# Patient Record
Sex: FEMALE | Race: WHITE | NOT HISPANIC OR LATINO | Employment: FULL TIME | URBAN - METROPOLITAN AREA
[De-identification: names, ages, dates, MRNs, and addresses within clinical notes are randomized per-mention and may not be internally consistent; named-entity substitution may affect disease eponyms.]

---

## 2017-08-21 ENCOUNTER — OFFICE VISIT (OUTPATIENT)
Dept: FAMILY MEDICINE CLINIC | Facility: CLINIC | Age: 47
End: 2017-08-21

## 2017-08-21 VITALS
WEIGHT: 182 LBS | OXYGEN SATURATION: 95 % | BODY MASS INDEX: 29.25 KG/M2 | DIASTOLIC BLOOD PRESSURE: 78 MMHG | RESPIRATION RATE: 20 BRPM | HEART RATE: 88 BPM | HEIGHT: 66 IN | SYSTOLIC BLOOD PRESSURE: 122 MMHG

## 2017-08-21 DIAGNOSIS — J30.1 SEASONAL ALLERGIC RHINITIS DUE TO POLLEN: Primary | ICD-10-CM

## 2017-08-21 PROCEDURE — 99213 OFFICE O/P EST LOW 20 MIN: CPT | Performed by: NURSE PRACTITIONER

## 2017-08-21 RX ORDER — CETIRIZINE HYDROCHLORIDE, PSEUDOEPHEDRINE HYDROCHLORIDE 5; 120 MG/1; MG/1
1 TABLET, FILM COATED, EXTENDED RELEASE ORAL 2 TIMES DAILY
COMMUNITY

## 2017-08-21 NOTE — PROGRESS NOTES
"Subjective   Ofe Whitehead is a 47 y.o. female.   Chief Complaint   Patient presents with   • Allergies     [facility]      Vitals:    08/21/17 0822   BP: 122/78   BP Location: Right arm   Patient Position: Sitting   Cuff Size: Adult   Pulse: 88   Resp: 20   SpO2: 95%   Weight: 182 lb (82.6 kg)   Height: 66\" (167.6 cm)     No Known Allergies    HPI Comments: New patient- zyrtec-d managing allergies.   \Reviewed labs- discussed slightly elevated glucose (102) all other labs normal.     URI    This is a recurrent problem. The current episode started more than 1 month ago. The problem has been waxing and waning. There has been no fever. Associated symptoms include congestion, a plugged ear sensation, rhinorrhea, sneezing and a sore throat. Pertinent negatives include no ear pain, headaches or swollen glands. She has tried antihistamine for the symptoms. The treatment provided moderate relief.        The following portions of the patient's history were reviewed and updated as appropriate: allergies, current medications, past family history, past medical history, past social history, past surgical history and problem list.    Review of Systems   Constitutional: Negative for chills, diaphoresis, fatigue and fever.   HENT: Positive for congestion, rhinorrhea, sneezing and sore throat. Negative for ear pain.    Eyes: Negative.    Respiratory: Negative.    Cardiovascular: Negative.    Gastrointestinal: Negative.    Skin: Negative.    Neurological: Negative for headaches.   All other systems reviewed and are negative.      Objective   Physical Exam   Constitutional: She is oriented to person, place, and time. She appears well-developed and well-nourished.   HENT:   Head: Normocephalic and atraumatic.   Right Ear: External ear normal.   Left Ear: External ear normal.   Cardiovascular: Normal rate.    Pulmonary/Chest: Effort normal.   Neurological: She is alert and oriented to person, place, and " time.   Psychiatric: She has a normal mood and affect. Her behavior is normal. Judgment and thought content normal.   Nursing note and vitals reviewed.      Assessment/Plan   Problems Addressed this Visit     None      Visit Diagnoses     Seasonal allergic rhinitis due to pollen    -  Primary

## 2017-10-07 ENCOUNTER — OFFICE VISIT (OUTPATIENT)
Dept: RETAIL CLINIC | Facility: CLINIC | Age: 47
End: 2017-10-07

## 2017-10-07 DIAGNOSIS — Z23 NEED FOR VACCINATION: Primary | ICD-10-CM

## 2017-10-07 NOTE — PATIENT INSTRUCTIONS
"Influenza (Flu) Vaccine (Inactivated or Recombinant):   1. Why get vaccinated?  Influenza (\"flu\") is a contagious disease that spreads around the [place] every year, usually between October and May.  Flu is caused by influenza viruses, and is spread mainly by coughing, sneezing, and close contact.  Anyone can get flu. Flu strikes suddenly and can last several days. Symptoms vary by age, but can include:  · fever/chills  · sore throat  · muscle aches  · fatigue  · cough  · headache  · runny or stuffy nose  Flu can also lead to pneumonia and blood infections, and cause diarrhea and seizures in children. If you have a medical condition, such as heart or lung disease, flu can make it worse.  Flu is more dangerous for some people. Infants and young children, people 65 years of age and older, pregnant women, and people with certain health conditions or a weakened immune system are at greatest risk.  Each year thousands of people in the [place] die from flu, and many more are hospitalized.  Flu vaccine can:  · keep you from getting flu,  · make flu less severe if you do get it, and  · keep you from spreading flu to your family and other people.  2. Inactivated and recombinant flu vaccines  A dose of flu vaccine is recommended every flu season. Children 6 months through 8 years of age may need two doses during the same flu season. Everyone else needs only one dose each flu season.  Some inactivated flu vaccines contain a very small amount of a mercury-based preservative called thimerosal. Studies have not shown thimerosal in vaccines to be harmful, but flu vaccines that do not contain thimerosal are available.  There is no live flu virus in flu shots. They cannot cause the flu.  There are many flu viruses, and they are always changing. Each year a new flu vaccine is made to protect against three or four viruses that are likely to cause disease in the upcoming flu season. But even when the vaccine doesn't exactly " match these viruses, it may still provide some protection.  Flu vaccine cannot prevent:  · flu that is caused by a virus not covered by the vaccine, or  · illnesses that look like flu but are not.  It takes about 2 weeks for protection to develop after vaccination, and protection lasts through the flu season.  3. Some people should not get this vaccine  Tell the person who is giving you the vaccine:  · If you have any severe, life-threatening allergies. If you ever had a life-threatening allergic reaction after a dose of flu vaccine, or have a severe allergy to any part of this vaccine, you may be advised not to get vaccinated. Most, but not all, types of flu vaccine contain a small amount of egg protein.  · If you ever had Guillain-Rosepine Syndrome (also called GBS). Some people with a history of GBS should not get this vaccine. This should be discussed with your doctor.  · If you are not feeling well. It is usually okay to get flu vaccine when you have a mild illness, but you might be asked to come back when you feel better.  4. Risks of a vaccine reaction  With any medicine, including vaccines, there is a chance of reactions. These are usually mild and go away on their own, but serious reactions are also possible.  Most people who get a flu shot do not have any problems with it.  Minor problems following a flu shot include:  · soreness, redness, or swelling where the shot was given  · hoarseness  · sore, red or itchy eyes  · cough  · fever  · aches  · headache  · itching  · fatigue  If these problems occur, they usually begin soon after the shot and last 1 or 2 days.  More serious problems following a flu shot can include the following:  · There may be a small increased risk of Guillain-Rosepine Syndrome (GBS) after inactivated flu vaccine. This risk has been estimated at 1 or 2 additional cases per million people vaccinated. This is much lower than the risk of severe complications from flu, which can be prevented by  flu vaccine.  · Young children who get the flu shot along with pneumococcal vaccine (PCV13) and/or DTaP vaccine at the same time might be slightly more likely to have a seizure caused by fever. Ask your doctor for more information. Tell your doctor if a child who is getting flu vaccine has ever had a seizure.  Problems that could happen after any injected vaccine:  · People sometimes faint after a medical procedure, including vaccination. Sitting or lying down for about 15 minutes can help prevent fainting, and injuries caused by a fall. Tell your doctor if you feel dizzy, or have vision changes or ringing in the ears.  · Some people get severe pain in the shoulder and have difficulty moving the arm where a shot was given. This happens very rarely.  · Any medication can cause a severe allergic reaction. Such reactions from a vaccine are very rare, estimated at about 1 in a million doses, and would happen within a few minutes to a few hours after the vaccination.  As with any medicine, there is a very remote chance of a vaccine causing a serious injury or death.  The safety of vaccines is always being monitored. For more information, visit: www.cdc.gov/vaccinesafety/  5. What if there is a serious reaction?  What should I look for?  · Look for anything that concerns you, such as signs of a severe allergic reaction, very high fever, or unusual behavior.  Signs of a severe allergic reaction can include hives, swelling of the face and throat, difficulty breathing, a fast heartbeat, dizziness, and weakness. These would start a few minutes to a few hours after the vaccination.  What should I do?  · If you think it is a severe allergic reaction or other emergency that can't wait, call 9-1-1 and get the person to the nearest hospital. Otherwise, call your doctor.  · Reactions should be reported to the Vaccine Adverse Event Reporting System (VAERS). Your doctor should file this report, or you can do it yourself through the  VAERS web site at www.vaers.[facility].gov, or by calling 1-832.393.8610.  VAERS does not give medical advice.  6. The National Vaccine Injury Compensation Program  The National Vaccine Injury Compensation Program (VICP) is a federal program that was created to compensate people who may have been injured by certain vaccines.  Persons who believe they may have been injured by a vaccine can learn about the program and about filing a claim by calling 1-499.182.3844 or visiting the VICP website at www.[facility].gov/vaccinecompensation. There is a time limit to file a claim for compensation.  7. How can I learn more?  · Ask your healthcare provider. He or she can give you the vaccine package insert or suggest other sources of information.  · Call your local or state health department.  · Contact the Centers for Disease Control and Prevention (CDC):    Call 1-141.910.8344 (0-749-UTH-INFO) or    Visit CDC's website at www.cdc.gov/flu  Vaccine Information Statement Inactivated Influenza Vaccine (08/07/2015)     This information is not intended to replace advice given to you by your health care provider. Make sure you discuss any questions you have with your health care provider.     Document Released: 10/12/2007 Document Revised: 01/08/2016 Document Reviewed: 08/10/2015  Elsevier Interactive Patient Education ©2017 [employer]

## 2017-12-22 ENCOUNTER — APPOINTMENT (OUTPATIENT)
Dept: WOMENS IMAGING | Facility: HOSPITAL | Age: 47
End: 2017-12-22

## 2017-12-22 PROCEDURE — G0202 SCR MAMMO BI INCL CAD: HCPCS | Performed by: RADIOLOGY

## 2017-12-22 PROCEDURE — 77067 SCR MAMMO BI INCL CAD: CPT | Performed by: RADIOLOGY

## 2018-10-13 ENCOUNTER — OFFICE VISIT (OUTPATIENT)
Dept: RETAIL CLINIC | Facility: CLINIC | Age: 48
End: 2018-10-13

## 2018-10-13 DIAGNOSIS — Z23 NEED FOR VACCINATION: Primary | ICD-10-CM

## 2018-10-13 NOTE — PATIENT INSTRUCTIONS
"Influenza (Flu) Vaccine (Inactivated or Recombinant): What You Need to Know  1. Why get vaccinated?  Influenza (\"flu\") is a contagious disease that spreads around the [place] every year, usually between October and May.  Flu is caused by influenza viruses, and is spread mainly by coughing, sneezing, and close contact.  Anyone can get flu. Flu strikes suddenly and can last several days. Symptoms vary by age, but can include:  · fever/chills  · sore throat  · muscle aches  · fatigue  · cough  · headache  · runny or stuffy nose    Flu can also lead to pneumonia and blood infections, and cause diarrhea and seizures in children. If you have a medical condition, such as heart or lung disease, flu can make it worse.  Flu is more dangerous for some people. Infants and young children, people 65 years of age and older, pregnant women, and people with certain health conditions or a weakened immune system are at greatest risk.  Each year thousands of people in the [place] die from flu, and many more are hospitalized.  Flu vaccine can:  · keep you from getting flu,  · make flu less severe if you do get it, and  · keep you from spreading flu to your family and other people.  2. Inactivated and recombinant flu vaccines  A dose of flu vaccine is recommended every flu season. Children 6 months through 8 years of age may need two doses during the same flu season. Everyone else needs only one dose each flu season.  Some inactivated flu vaccines contain a very small amount of a mercury-based preservative called thimerosal. Studies have not shown thimerosal in vaccines to be harmful, but flu vaccines that do not contain thimerosal are available.  There is no live flu virus in flu shots. They cannot cause the flu.  There are many flu viruses, and they are always changing. Each year a new flu vaccine is made to protect against three or four viruses that are likely to cause disease in the upcoming flu season. But even when the " vaccine doesn't exactly match these viruses, it may still provide some protection.  Flu vaccine cannot prevent:  · flu that is caused by a virus not covered by the vaccine, or  · illnesses that look like flu but are not.    It takes about 2 weeks for protection to develop after vaccination, and protection lasts through the flu season.  3. Some people should not get this vaccine  Tell the person who is giving you the vaccine:  · If you have any severe, life-threatening allergies. If you ever had a life-threatening allergic reaction after a dose of flu vaccine, or have a severe allergy to any part of this vaccine, you may be advised not to get vaccinated. Most, but not all, types of flu vaccine contain a small amount of egg protein.  · If you ever had Guillain-Barré Syndrome (also called GBS). Some people with a history of GBS should not get this vaccine. This should be discussed with your doctor.  · If you are not feeling well. It is usually okay to get flu vaccine when you have a mild illness, but you might be asked to come back when you feel better.    4. Risks of a vaccine reaction  With any medicine, including vaccines, there is a chance of reactions. These are usually mild and go away on their own, but serious reactions are also possible.  Most people who get a flu shot do not have any problems with it.  Minor problems following a flu shot include:  · soreness, redness, or swelling where the shot was given  · hoarseness  · sore, red or itchy eyes  · cough  · fever  · aches  · headache  · itching  · fatigue    If these problems occur, they usually begin soon after the shot and last 1 or 2 days.  More serious problems following a flu shot can include the following:  · There may be a small increased risk of Guillain-[place] Syndrome (GBS) after inactivated flu vaccine. This risk has been estimated at 1 or 2 additional cases per million people vaccinated. This is much lower than the risk of severe complications from  flu, which can be prevented by flu vaccine.  · Young children who get the flu shot along with pneumococcal vaccine (PCV13) and/or DTaP vaccine at the same time might be slightly more likely to have a seizure caused by fever. Ask your doctor for more information. Tell your doctor if a child who is getting flu vaccine has ever had a seizure.    Problems that could happen after any injected vaccine:  · People sometimes faint after a medical procedure, including vaccination. Sitting or lying down for about 15 minutes can help prevent fainting, and injuries caused by a fall. Tell your doctor if you feel dizzy, or have vision changes or ringing in the ears.  · Some people get severe pain in the shoulder and have difficulty moving the arm where a shot was given. This happens very rarely.  · Any medication can cause a severe allergic reaction. Such reactions from a vaccine are very rare, estimated at about 1 in a million doses, and would happen within a few minutes to a few hours after the vaccination.  As with any medicine, there is a very remote chance of a vaccine causing a serious injury or death.  The safety of vaccines is always being monitored. For more information, visit: www.cdc.gov/vaccinesafety/  5. What if there is a serious reaction?  What should I look for?  Look for anything that concerns you, such as signs of a severe allergic reaction, very high fever, or unusual behavior.  Signs of a severe allergic reaction can include hives, swelling of the face and throat, difficulty breathing, a fast heartbeat, dizziness, and weakness. These would start a few minutes to a few hours after the vaccination.  What should I do?  · If you think it is a severe allergic reaction or other emergency that can't wait, call 9-1-1 and get the person to the nearest hospital. Otherwise, call your doctor.  · Reactions should be reported to the Vaccine Adverse Event Reporting System (VAERS). Your doctor should file this report, or you  can do it yourself through the VAERS web site at www.vaers.[facility].gov, or by calling 1-533.916.4231.  ? VAERS does not give medical advice.  6. The National Vaccine Injury Compensation Program  The National Vaccine Injury Compensation Program (VICP) is a federal program that was created to compensate people who may have been injured by certain vaccines.  Persons who believe they may have been injured by a vaccine can learn about the program and about filing a claim by calling 1-622.689.1275 or visiting the VICP website at www.[facility].gov/vaccinecompensation. There is a time limit to file a claim for compensation.  7. How can I learn more?  · Ask your healthcare provider. He or she can give you the vaccine package insert or suggest other sources of information.  · Call your local or state health department.  · Contact the Centers for Disease Control and Prevention (CDC):  ? Call 1-906.289.1530 (1-842-WTV-INFO) or  ? Visit CDC's website at www.cdc.gov/flu  Vaccine Information Statement, Inactivated Influenza Vaccine (08/07/2015)  This information is not intended to replace advice given to you by your health care provider. Make sure you discuss any questions you have with your health care provider.  Document Released: 10/12/2007 Document Revised: 09/07/2017 Document Reviewed: 09/07/2017  Elsevier Interactive Patient Education © 2017 [employer].

## 2019-01-17 ENCOUNTER — APPOINTMENT (OUTPATIENT)
Dept: WOMENS IMAGING | Facility: HOSPITAL | Age: 49
End: 2019-01-17

## 2019-01-17 PROCEDURE — 77067 SCR MAMMO BI INCL CAD: CPT | Performed by: RADIOLOGY

## 2019-01-17 PROCEDURE — 77063 BREAST TOMOSYNTHESIS BI: CPT | Performed by: RADIOLOGY

## 2019-04-19 ENCOUNTER — OFFICE VISIT (OUTPATIENT)
Dept: FAMILY MEDICINE CLINIC | Facility: CLINIC | Age: 49
End: 2019-04-19

## 2019-04-19 VITALS
HEIGHT: 66 IN | HEART RATE: 84 BPM | WEIGHT: 177 LBS | RESPIRATION RATE: 16 BRPM | OXYGEN SATURATION: 97 % | BODY MASS INDEX: 28.45 KG/M2 | SYSTOLIC BLOOD PRESSURE: 124 MMHG | TEMPERATURE: 98 F | DIASTOLIC BLOOD PRESSURE: 80 MMHG

## 2019-04-19 DIAGNOSIS — Z00.00 ANNUAL PHYSICAL EXAM: Primary | ICD-10-CM

## 2019-04-19 DIAGNOSIS — Z23 NEED FOR TDAP VACCINATION: ICD-10-CM

## 2019-04-19 DIAGNOSIS — R42 DIZZINESS: ICD-10-CM

## 2019-04-19 DIAGNOSIS — Z12.11 COLON CANCER SCREENING: ICD-10-CM

## 2019-04-19 PROCEDURE — 90715 TDAP VACCINE 7 YRS/> IM: CPT | Performed by: NURSE PRACTITIONER

## 2019-04-19 PROCEDURE — 99396 PREV VISIT EST AGE 40-64: CPT | Performed by: NURSE PRACTITIONER

## 2019-04-19 PROCEDURE — 90471 IMMUNIZATION ADMIN: CPT | Performed by: NURSE PRACTITIONER

## 2019-04-19 RX ORDER — FLUTICASONE PROPIONATE 50 MCG
2 SPRAY, SUSPENSION (ML) NASAL DAILY
COMMUNITY
Start: 2019-04-19 | End: 2021-01-14

## 2019-04-19 NOTE — PATIENT INSTRUCTIONS
Dizziness  Dizziness is a common problem. It makes you feel unsteady or light-headed. You may feel like you are about to pass out (faint). Dizziness can lead to getting hurt if you stumble or fall. Dizziness can be caused by many things, including:  · Medicines.  · Not having enough water in your body (dehydration).  · Illness.    Follow these instructions at home:  Eating and drinking  · Drink enough fluid to keep your pee (urine) clear or pale yellow. This helps to keep you from getting dehydrated. Try to drink more clear fluids, such as water.  · Do not drink alcohol.  · Limit how much caffeine you drink or eat, if your doctor tells you to do that.  · Limit how much salt (sodium) you drink or eat, if your doctor tells you to do that.  Activity  · Avoid making quick movements.  ? When you stand up from sitting in a chair, steady yourself until you feel okay.  ? In the morning, first sit up on the side of the bed. When you feel okay, stand slowly while you hold onto something. Do this until you know that your balance is fine.  · If you need to  one place for a long time, move your legs often. Tighten and relax the muscles in your legs while you are standing.  · Do not drive or use heavy machinery if you feel dizzy.  · Avoid bending down if you feel dizzy. Place items in your home so you can reach them easily without leaning over.  Lifestyle  · Do not use any products that contain nicotine or tobacco, such as cigarettes and e-cigarettes. If you need help quitting, ask your doctor.  · Try to lower your stress level. You can do this by using methods such as yoga or meditation. Talk with your doctor if you need help.  General instructions  · Watch your dizziness for any changes.  · Take over-the-counter and prescription medicines only as told by your doctor. Talk with your doctor if you think that you are dizzy because of a medicine that you are taking.  · Tell a friend or a family member that you are feeling  dizzy. If he or she notices any changes in your behavior, have this person call your doctor.  · Keep all follow-up visits as told by your doctor. This is important.  Contact a doctor if:  · Your dizziness does not go away.  · Your dizziness or light-headedness gets worse.  · You feel sick to your stomach (nauseous).  · You have trouble hearing.  · You have new symptoms.  · You are unsteady on your feet.  · You feel like the room is spinning.  Get help right away if:  · You throw up (vomit) or have watery poop (diarrhea), and you cannot eat or drink anything.  · You have trouble:  ? Talking.  ? Walking.  ? Swallowing.  ? Using your arms, hands, or legs.  · You feel generally weak.  · You are not thinking clearly, or you have trouble forming sentences. A friend or family member may notice this.  · You have:  ? Chest pain.  ? Pain in your belly (abdomen).  ? Shortness of breath.  ? Sweating.  · Your vision changes.  · You are bleeding.  · You have a very bad headache.  · You have neck pain or a stiff neck.  · You have a fever.  These symptoms may be an emergency. Do not wait to see if the symptoms will go away. Get medical help right away. Call your local emergency services (911 in the [place] Do not drive yourself to the hospital.  Summary  · Dizziness makes you feel unsteady or light-headed. You may feel like you are about to pass out (faint).  · Drink enough fluid to keep your pee (urine) clear or pale yellow. Do not drink alcohol.  · Avoid making quick movements if you feel dizzy.  · Watch your dizziness for any changes.  This information is not intended to replace advice given to you by your health care provider. Make sure you discuss any questions you have with your health care provider.  Document Released: 12/06/2012 Document Revised: 01/04/2018 Document Reviewed: 01/04/2018  Elsevier Interactive Patient Education © 2019 [employer].

## 2019-04-19 NOTE — PROGRESS NOTES
Patient ID: Ofe Whitehead is a 48 y.o. female     Subjective     Chief Complaint   Patient presents with   • Annual Exam       History of Present Illness    Ofe Whitehead was a patient of INDRA Paulson who is no longer with our practice. The last time she was seen was August 2017.  I will be taking over this patient's primary care.  This is the first time patient is seeing me.  She presents to the office today for annual physical.  Since she was last seen, she states she has been doing well except for complaints of dizziness.  She has had problems with this in the past and has been completing Epley maneuvers at home.  She notices dizziness is worse when turning her head to the left.  She also has problems with allergies which have flared up over the past couple weeks.  She takes albuterol inhaler as needed for wheezing.  Denies any other problems or concerns.  She is followed by GYN for routine Paps and mammograms.  Last Pap completed in July 2018 which was normal.  Mammogram completed in January 2019 which was also normal.  She denies any complaints of fever, chills, cough, chest pain, shortness of air, abdominal pain, nausea, or any other concerns.     The following portions of the patient's history were reviewed and updated as appropriate: allergies, current medications, past family history, past medical history, past social history, past surgical history and problem list.       Review of Systems   Constitution: Negative.   HENT: Negative.    Eyes: Negative.    Cardiovascular: Negative.    Respiratory: Negative.    Endocrine: Negative.    Hematologic/Lymphatic: Negative.    Skin: Negative.    Musculoskeletal: Negative.    Gastrointestinal: Negative.    Genitourinary: Negative.    Neurological: Positive for dizziness.   Psychiatric/Behavioral: Negative.        Vitals:    04/19/19 0825   BP: 124/80   Pulse: 84   Resp: 16   Temp: 98 °F (36.7 °C)   SpO2: 97%       Documented weights    04/19/19 0825    Weight: 80.3 kg (177 lb)     Body mass index is 28.57 kg/m².    No results found for this or any previous visit.    Objective     Physical Exam   Constitutional: She is oriented to person, place, and time. Vital signs are normal. She appears well-developed.   HENT:   Head: Normocephalic and atraumatic.   Right Ear: Tympanic membrane is bulging.   Left Ear: Tympanic membrane is bulging.   Mouth/Throat: Oropharynx is clear and moist.   Eyes: EOM are normal. Pupils are equal, round, and reactive to light.   Neck: Normal range of motion. Neck supple.   Cardiovascular: Normal rate, regular rhythm, normal heart sounds and intact distal pulses.   No murmur heard.  Pulmonary/Chest: Effort normal and breath sounds normal. She has no wheezes. She has no rhonchi. She has no rales.   Abdominal: Soft. Bowel sounds are normal. There is no hepatosplenomegaly. There is no tenderness.   Musculoskeletal: Normal range of motion. She exhibits no edema or tenderness.   Neurological: She is alert and oriented to person, place, and time. She has normal strength.   Skin: Skin is warm and dry. No rash noted. No cyanosis or erythema.   Psychiatric: She has a normal mood and affect. Her behavior is normal.          Assessment/Plan     Assessment/Plan   Ofe was seen today for annual exam.    Diagnoses and all orders for this visit:    Annual physical exam  -     CBC & Differential; Future  -     Comprehensive Metabolic Panel; Future  -     Lipid Panel; Future  -     TSH; Future  -     Vitamin D 25 Hydroxy; Future    Colon cancer screening  -     Ambulatory Referral For Screening Colonoscopy    Need for Tdap vaccination  -     Tdap Vaccine Greater Than or Equal To 6yo IM    Dizziness    Other orders  -     fluticasone (FLONASE) 50 MCG/ACT nasal spray; 2 sprays into the nostril(s) as directed by provider Daily.      Summary:  Ofe Whitehead has been doing well since she was last seen.  She is not fasting today.  I have placed orders for  annual fasting labs and she will have completed at hospital at her convenience.  Once completed we will call her with results.  Her dizziness appears to be related to benign positional vertigo.  Instructed could also benefit from using Flonase nasal spray 2 sprays each nostril once a day to see if helps.  Continue Epley maneuvers at home.  She is only been doing the exercises at night.  Instructed should be doing at least in the morning and at bedtime up to 3 times a day.  If no improvement in symptoms, she knows to contact our office and would recommend physical therapy for vestibular studies.  She turns 49 at the end of the month.  Will order for screening colonoscopy.  She requests Dr. Jimenez and to have procedure completed at [place].  If labs stable, instructed to follow-up in 1 year for next annual physical with fasting labs.    In the meantime, instructed to contact us sooner for any problems or concerns.    Lilliam Barrientos, APRN  Family Medicine  [facility] [place]  04/19/19  9:38 AM

## 2019-04-22 ENCOUNTER — RESULTS ENCOUNTER (OUTPATIENT)
Dept: FAMILY MEDICINE CLINIC | Facility: CLINIC | Age: 49
End: 2019-04-22

## 2019-04-22 DIAGNOSIS — Z00.00 ANNUAL PHYSICAL EXAM: ICD-10-CM

## 2019-05-07 ENCOUNTER — APPOINTMENT (OUTPATIENT)
Dept: LAB | Facility: HOSPITAL | Age: 49
End: 2019-05-07

## 2019-05-07 ENCOUNTER — TELEPHONE (OUTPATIENT)
Dept: GASTROENTEROLOGY | Facility: CLINIC | Age: 49
End: 2019-05-07

## 2019-05-07 LAB
25(OH)D3 SERPL-MCNC: 9.3 NG/ML (ref 30–100)
ALBUMIN SERPL-MCNC: 4.4 G/DL (ref 3.5–5.2)
ALBUMIN/GLOB SERPL: 1.6 G/DL
ALP SERPL-CCNC: 74 U/L (ref 39–117)
ALT SERPL W P-5'-P-CCNC: 39 U/L (ref 1–33)
ANION GAP SERPL CALCULATED.3IONS-SCNC: 12.1 MMOL/L
AST SERPL-CCNC: 22 U/L (ref 1–32)
BASOPHILS # BLD AUTO: 0.03 10*3/MM3 (ref 0–0.2)
BASOPHILS NFR BLD AUTO: 0.3 % (ref 0–1.5)
BILIRUB SERPL-MCNC: 0.6 MG/DL (ref 0.2–1.2)
BUN BLD-MCNC: 14 MG/DL (ref 6–20)
BUN/CREAT SERPL: 15.7 (ref 7–25)
CALCIUM SPEC-SCNC: 8.7 MG/DL (ref 8.6–10.5)
CHLORIDE SERPL-SCNC: 99 MMOL/L (ref 98–107)
CHOLEST SERPL-MCNC: 146 MG/DL (ref 0–200)
CO2 SERPL-SCNC: 21.9 MMOL/L (ref 22–29)
CREAT BLD-MCNC: 0.89 MG/DL (ref 0.57–1)
DEPRECATED RDW RBC AUTO: 40.5 FL (ref 37–54)
EOSINOPHIL # BLD AUTO: 0.19 10*3/MM3 (ref 0–0.4)
EOSINOPHIL NFR BLD AUTO: 1.8 % (ref 0.3–6.2)
ERYTHROCYTE [DISTWIDTH] IN BLOOD BY AUTOMATED COUNT: 12.4 % (ref 12.3–15.4)
GFR SERPL CREATININE-BSD FRML MDRD: 67 ML/MIN/1.73
GLOBULIN UR ELPH-MCNC: 2.8 GM/DL
GLUCOSE BLD-MCNC: 102 MG/DL (ref 65–99)
HCT VFR BLD AUTO: 44.6 % (ref 34–46.6)
HDLC SERPL-MCNC: 38 MG/DL (ref 40–60)
HGB BLD-MCNC: 14 G/DL (ref 12–15.9)
IMM GRANULOCYTES # BLD AUTO: 0.05 10*3/MM3 (ref 0–0.05)
IMM GRANULOCYTES NFR BLD AUTO: 0.5 % (ref 0–0.5)
LDLC SERPL CALC-MCNC: 90 MG/DL (ref 0–100)
LDLC/HDLC SERPL: 2.36 {RATIO}
LYMPHOCYTES # BLD AUTO: 3.21 10*3/MM3 (ref 0.7–3.1)
LYMPHOCYTES NFR BLD AUTO: 30.8 % (ref 19.6–45.3)
MCH RBC QN AUTO: 28.1 PG (ref 26.6–33)
MCHC RBC AUTO-ENTMCNC: 31.4 G/DL (ref 31.5–35.7)
MCV RBC AUTO: 89.6 FL (ref 79–97)
MONOCYTES # BLD AUTO: 0.85 10*3/MM3 (ref 0.1–0.9)
MONOCYTES NFR BLD AUTO: 8.2 % (ref 5–12)
NEUTROPHILS # BLD AUTO: 6.08 10*3/MM3 (ref 1.7–7)
NEUTROPHILS NFR BLD AUTO: 58.4 % (ref 42.7–76)
NRBC BLD AUTO-RTO: 0 /100 WBC (ref 0–0.2)
PLATELET # BLD AUTO: 283 10*3/MM3 (ref 140–450)
PMV BLD AUTO: 11.6 FL (ref 6–12)
POTASSIUM BLD-SCNC: 4 MMOL/L (ref 3.5–5.2)
PROT SERPL-MCNC: 7.2 G/DL (ref 6–8.5)
RBC # BLD AUTO: 4.98 10*6/MM3 (ref 3.77–5.28)
SODIUM BLD-SCNC: 133 MMOL/L (ref 136–145)
TRIGL SERPL-MCNC: 92 MG/DL (ref 0–150)
TSH SERPL DL<=0.05 MIU/L-ACNC: 0.98 MIU/ML (ref 0.27–4.2)
VLDLC SERPL-MCNC: 18.4 MG/DL (ref 5–40)
WBC NRBC COR # BLD: 10.41 10*3/MM3 (ref 3.4–10.8)

## 2019-05-07 PROCEDURE — 80061 LIPID PANEL: CPT | Performed by: NURSE PRACTITIONER

## 2019-05-07 PROCEDURE — 85025 COMPLETE CBC W/AUTO DIFF WBC: CPT | Performed by: NURSE PRACTITIONER

## 2019-05-07 PROCEDURE — 84443 ASSAY THYROID STIM HORMONE: CPT | Performed by: NURSE PRACTITIONER

## 2019-05-07 PROCEDURE — 80053 COMPREHEN METABOLIC PANEL: CPT | Performed by: NURSE PRACTITIONER

## 2019-05-07 PROCEDURE — 82306 VITAMIN D 25 HYDROXY: CPT | Performed by: NURSE PRACTITIONER

## 2019-05-10 DIAGNOSIS — E55.9 VITAMIN D DEFICIENCY: Primary | ICD-10-CM

## 2019-05-10 RX ORDER — ERGOCALCIFEROL 1.25 MG/1
50000 CAPSULE ORAL WEEKLY
Qty: 30 CAPSULE | Refills: 0 | Status: SHIPPED | OUTPATIENT
Start: 2019-05-10 | End: 2021-01-14

## 2019-05-14 ENCOUNTER — PREP FOR SURGERY (OUTPATIENT)
Dept: OTHER | Facility: HOSPITAL | Age: 49
End: 2019-05-14

## 2019-05-14 DIAGNOSIS — Z12.11 SCREEN FOR COLON CANCER: Primary | ICD-10-CM

## 2019-05-16 NOTE — TELEPHONE ENCOUNTER
RETURN CALL FROM PATIENT.  SCHEDULED [facility] 07/29/2019 AT 8:45AM - ARRIVE 7:30AM.  E-MAILED INSTRUCTIONS [email or web address] TODAY.

## 2019-07-29 ENCOUNTER — LAB REQUISITION (OUTPATIENT)
Dept: LAB | Facility: HOSPITAL | Age: 49
End: 2019-07-29

## 2019-07-29 ENCOUNTER — OUTSIDE FACILITY SERVICE (OUTPATIENT)
Dept: GASTROENTEROLOGY | Facility: CLINIC | Age: 49
End: 2019-07-29

## 2019-07-29 DIAGNOSIS — Z12.11 ENCOUNTER FOR SCREENING FOR MALIGNANT NEOPLASM OF COLON: ICD-10-CM

## 2019-07-29 PROCEDURE — 45380 COLONOSCOPY AND BIOPSY: CPT | Performed by: INTERNAL MEDICINE

## 2019-07-29 PROCEDURE — 88305 TISSUE EXAM BY PATHOLOGIST: CPT | Performed by: INTERNAL MEDICINE

## 2019-07-30 LAB
CYTO UR: NORMAL
LAB AP CASE REPORT: NORMAL
LAB AP CLINICAL INFORMATION: NORMAL
PATH REPORT.FINAL DX SPEC: NORMAL
PATH REPORT.GROSS SPEC: NORMAL

## 2019-10-17 ENCOUNTER — IMMUNIZATION (OUTPATIENT)
Dept: RETAIL CLINIC | Facility: CLINIC | Age: 49
End: 2019-10-17

## 2019-10-17 DIAGNOSIS — Z23 NEED FOR VACCINATION: Primary | ICD-10-CM

## 2019-10-17 PROCEDURE — 90686 IIV4 VACC NO PRSV 0.5 ML IM: CPT | Performed by: NURSE PRACTITIONER

## 2019-10-17 PROCEDURE — 90471 IMMUNIZATION ADMIN: CPT | Performed by: NURSE PRACTITIONER

## 2019-10-17 NOTE — PATIENT INSTRUCTIONS
Preventing Influenza, Adult  Influenza, more commonly known as “the flu,” is a viral infection that mainly affects the respiratory tract. The respiratory tract includes structures that help you breathe, such as the lungs, nose, and throat. The flu causes many common cold symptoms, as well as a high fever and body aches.  The flu spreads easily from person to person (is contagious). The flu is most common from December through March. This is called flu season. You can catch the flu virus by:  · Breathing in droplets from an infected person's cough or sneeze.  · Touching something that was recently contaminated with the virus and then touching your mouth, nose, or eyes.  What can I do to lower my risk?  You can decrease your risk of getting the flu by:  · Getting a flu shot (influenza vaccination) every year. This is the best way to prevent the flu. A flu shot is recommended for everyone age 6 months and older.  ? It is best to get a flu shot in the fall, as soon as it is available. Getting a flu shot during winter or spring instead is still a good idea. Flu season can last into early spring.  ? Preventing the flu through vaccination requires getting a new flu shot every year. This is because the flu virus changes slightly (mutates) from one year to the next. Even if a flu shot does not completely protect you from all flu virus mutations, it can reduce the severity of your illness and prevent dangerous complications of the flu.  ? If you are pregnant, you can and should get a flu shot.  ? If you have had a reaction to the shot in the past or if you are allergic to eggs, check with your health care provider before getting a flu shot.  ? Sometimes the vaccine is available as a nasal spray. In some years, the nasal spray has not been as effective against the flu virus. Check with your health care provider if you have questions about this.  · Practicing good health habits. This is especially important during flu  season.  ? Avoid contact with people who are sick with flu or cold symptoms.  ? Wash your hands with soap and water often. If soap and water are not available, use alcohol-based hand .  ? Avoid touching your hands to your face, especially when you have not washed your hands recently.  ? Use a disinfectant to clean surfaces at home and at work that may be contaminated with the flu virus.  ? Keep your body’s disease-fighting system (immune system) in good shape by eating a healthy diet, drinking plenty of fluids, getting enough sleep, and exercising regularly.  If you do get the flu, avoid spreading it to others by:  · Staying home until your symptoms have been gone for at least one day.  · Covering your mouth and nose when you cough or sneeze.  · Avoiding close contact with others, especially babies and elderly people.  Why are these changes important?  Getting a flu shot and practicing good health habits protects you as well as other people. If you get the flu, your friends, family, and co-workers are also at risk of getting it, because it spreads so easily to others. Each year, about 2 out of every 10 people get the flu.  Having the flu can lead to complications, such as pneumonia, ear infection, and sinus infection. The flu also can be deadly, especially for babies, people older than age 65, and people who have serious long-term diseases.  How is this treated?  Most people recover from the flu by resting at home and drinking plenty of fluids. However, a prescription antiviral medicine may reduce your flu symptoms and may make your flu go away sooner. This medicine must be started within a few days of getting flu symptoms. You can talk with your health care provider about whether you need an antiviral medicine.  Antiviral medicine may be prescribed for people who are at risk for more serious flu symptoms. This includes people who:  · Are older than age 65.  · Are pregnant.  · Have a condition that makes  the flu worse or more dangerous.  Where to find more information  · Centers for Disease Control and Prevention: www.cdc.gov/flu/index.htm  · Flu.gov: www.flu.gov/prevention-vaccination  · American Academy of Family [employer]icians: familydoctor.org/familydoctor/en/kids/vaccines/preventing-the-flu.html  Contact a health care provider if:  · You have influenza and you develop new symptoms.  · You have:  ? Chest pain.  ? Diarrhea.  ? A fever.  · Your cough gets worse, or you produce more mucus.  Summary  · The best way to prevent the flu is to get a flu shot every year in the fall.  · Even if you get the flu after you have received the yearly vaccine, your flu may be milder and go away sooner because of your flu shot.  · If you get the flu, antiviral medicines that are started with a few days of symptoms may reduce your flu symptoms and may make your flu go away sooner.  · You can also help prevent the flu by practicing good health habits.  This information is not intended to replace advice given to you by your health care provider. Make sure you discuss any questions you have with your health care provider.  Document Released: 01/01/2017 Document Revised: 11/16/2018 Document Reviewed: 08/26/2017  [employer] Interactive Patient Education © 2019 [employer]

## 2020-01-20 ENCOUNTER — APPOINTMENT (OUTPATIENT)
Dept: WOMENS IMAGING | Facility: HOSPITAL | Age: 50
End: 2020-01-20

## 2020-01-20 PROCEDURE — 77067 SCR MAMMO BI INCL CAD: CPT | Performed by: RADIOLOGY

## 2020-01-20 PROCEDURE — 77063 BREAST TOMOSYNTHESIS BI: CPT | Performed by: RADIOLOGY

## 2021-01-14 ENCOUNTER — HOSPITAL ENCOUNTER (OUTPATIENT)
Dept: GENERAL RADIOLOGY | Facility: HOSPITAL | Age: 51
Discharge: HOME | End: 2021-01-14
Admitting: FAMILY MEDICINE

## 2021-01-14 ENCOUNTER — OFFICE VISIT (OUTPATIENT)
Dept: FAMILY MEDICINE CLINIC | Facility: CLINIC | Age: 51
End: 2021-01-14

## 2021-01-14 VITALS
DIASTOLIC BLOOD PRESSURE: 90 MMHG | OXYGEN SATURATION: 99 % | HEIGHT: 66 IN | TEMPERATURE: 97.3 F | WEIGHT: 186.6 LBS | BODY MASS INDEX: 29.99 KG/M2 | SYSTOLIC BLOOD PRESSURE: 140 MMHG | HEART RATE: 82 BPM

## 2021-01-14 DIAGNOSIS — M25.512 ACUTE PAIN OF LEFT SHOULDER: Primary | ICD-10-CM

## 2021-01-14 DIAGNOSIS — M25.512 ACUTE PAIN OF LEFT SHOULDER: ICD-10-CM

## 2021-01-14 PROCEDURE — 99213 OFFICE O/P EST LOW 20 MIN: CPT | Performed by: FAMILY MEDICINE

## 2021-01-14 PROCEDURE — 73030 X-RAY EXAM OF SHOULDER: CPT

## 2021-01-14 RX ORDER — PREDNISONE 20 MG/1
40 TABLET ORAL DAILY
Qty: 10 TABLET | Refills: 0 | Status: SHIPPED | OUTPATIENT
Start: 2021-01-14 | End: 2021-01-19

## 2021-01-14 NOTE — PROGRESS NOTES
Chief Complaint   Patient presents with   • should blade pain       Subjective   Ofe Whitehead is a 50 y.o. female.     History of Present Illness   50-year-old female here for concern for left shoulder pain    Has had episodes like this shoulder before.  Primarily the muscles over her left upper back region near her scapula.  She said when she went to get a massage 1 time they noted she had had some knots in the region and tense muscles.  This episode has been going on for several days and has caused some sharp pain down her arm as well.  It all starts from the posterior shoulder region.  She is not having any weakness.  No numbness.  Her strength is good.  She has never had a prior injury to this shoulder.  No history of any rotator cuff issues.    The following portions of the patient's history were reviewed and updated as appropriate: allergies, current medications, past family history, past medical history, past social history, past surgical history and problem list.      Past Medical History:   Diagnosis Date   • Asthma    • Hypoglycemia        Past Surgical History:   Procedure Laterality Date   •  SECTION         Family History   Problem Relation Age of Onset   • Diabetes Mother    • Hypertension Mother    • Heart disease Father    • COPD Father    • Nephrolithiasis Father    • Colon cancer Neg Hx    • Colon polyps Neg Hx        Social History     Socioeconomic History   • Marital status:      Spouse name: Not on file   • Number of children: Not on file   • Years of education: Not on file   • Highest education level: Not on file   Tobacco Use   • Smoking status: Never Smoker   • Smokeless tobacco: Never Used   Substance and Sexual Activity   • Alcohol use: No   • Drug use: No   • Sexual activity: Never       Current Outpatient Medications on File Prior to Visit   Medication Sig Dispense Refill   • albuterol (PROVENTIL HFA;VENTOLIN HFA) 108 (90 BASE) MCG/ACT inhaler Inhale 2 puffs Every 4  (Four) Hours As Needed for wheezing. 1 inhaler 0   • cetirizine-pseudoephedrine (ZyrTEC-D) 5-120 MG per 12 hr tablet Take 1 tablet by mouth 2 (Two) Times a Day.     • Probiotic Product (ALIGN PO) Take  by mouth.     • VITAMIN D PO Take  by mouth.     • [DISCONTINUED] fluticasone (FLONASE) 50 MCG/ACT nasal spray 2 sprays into the nostril(s) as directed by provider Daily.     • [DISCONTINUED] vitamin D (ERGOCALCIFEROL) 60313 units capsule capsule Take 1 capsule by mouth 1 (One) Time Per Week. 30 capsule 0     No current facility-administered medications on file prior to visit.        Review of Systems   Constitutional: Negative for activity change, chills and fever.   HENT: Negative for congestion, postnasal drip and rhinorrhea.    Eyes: Negative for blurred vision and pain.   Respiratory: Negative for cough, chest tightness and shortness of breath.    Cardiovascular: Negative for chest pain.   Gastrointestinal: Negative for abdominal pain, constipation, diarrhea, nausea and vomiting.   Endocrine: Negative for cold intolerance and heat intolerance.   Genitourinary: Negative for decreased urine volume, dysuria and frequency.   Musculoskeletal: Negative for arthralgias, back pain and myalgias.        Shoulder pain   Skin: Negative for rash and skin lesions.   Neurological: Negative for dizziness and confusion.   Psychiatric/Behavioral: Negative for agitation, behavioral problems and depressed mood.           Vitals:    01/14/21 1144   BP: 140/90   Pulse: 82   Temp: 97.3 °F (36.3 °C)   SpO2: 99%      Objective   Physical Exam  Vitals signs and nursing note reviewed.   Constitutional:       Appearance: She is well-developed.   HENT:      Head: Normocephalic and atraumatic.   Eyes:      Conjunctiva/sclera: Conjunctivae normal.      Pupils: Pupils are equal, round, and reactive to light.   Neck:      Musculoskeletal: Neck supple.   Cardiovascular:      Rate and Rhythm: Normal rate and regular rhythm.      Heart sounds:  Normal heart sounds. No murmur.   Pulmonary:      Effort: Pulmonary effort is normal. No respiratory distress.      Breath sounds: Normal breath sounds.   Abdominal:      General: Bowel sounds are normal.      Palpations: Abdomen is soft.   Musculoskeletal: Normal range of motion.         General: No swelling, deformity or signs of injury.      Comments: Tenderness over the muscles of left posterior shoulder   Skin:     General: Skin is warm and dry.   Neurological:      Mental Status: She is alert and oriented to person, place, and time.       Neg empty can, [place]     Assessment/Plan   Problems Addressed this Visit     None      Visit Diagnoses     Acute pain of left shoulder    -  Primary    Relevant Medications    predniSONE (DELTASONE) 20 MG tablet    Other Relevant Orders    XR Shoulder 2+ View Left      Diagnoses       Codes Comments    Acute pain of left shoulder    -  Primary ICD-10-CM: M25.512  ICD-9-CM: 719.41       -She is wanting x-ray to rule out anything else underlying going on so we will order    -Possibly pinched irritated nerve coming from shoulder region.  Will give short course of steroids.  We will also have her take Tylenol and ice.  She said icing has been helping.    -Will likely need referral to physical therapy           Barbie Villareal MD

## 2021-01-14 NOTE — PROGRESS NOTES
Could you please notify:    No underlying issues found in her x-ray.  The shoulder x-ray was normal.  Likely a pinched nerve near her shoulder region.  Would have her take the steroids that were sent into her pharmacy.  As well as Tylenol and icing it.      If she is not having any improvement in the next 48 hours to let us know/let us know if anything worsens or she develops any weakness etc.

## 2022-06-03 ENCOUNTER — APPOINTMENT (OUTPATIENT)
Dept: WOMENS IMAGING | Facility: HOSPITAL | Age: 52
End: 2022-06-03

## 2022-06-03 PROCEDURE — 77067 SCR MAMMO BI INCL CAD: CPT | Performed by: RADIOLOGY

## 2022-06-03 PROCEDURE — 77063 BREAST TOMOSYNTHESIS BI: CPT | Performed by: RADIOLOGY

## 2023-07-13 ENCOUNTER — APPOINTMENT (OUTPATIENT)
Dept: WOMENS IMAGING | Facility: HOSPITAL | Age: 53
End: 2023-07-13
Payer: COMMERCIAL

## 2023-07-13 PROCEDURE — 77063 BREAST TOMOSYNTHESIS BI: CPT | Performed by: RADIOLOGY

## 2023-07-13 PROCEDURE — 77067 SCR MAMMO BI INCL CAD: CPT | Performed by: RADIOLOGY

## 2023-08-21 ENCOUNTER — OFFICE VISIT (OUTPATIENT)
Dept: FAMILY MEDICINE CLINIC | Facility: CLINIC | Age: 53
End: 2023-08-21
Payer: COMMERCIAL

## 2023-08-21 VITALS
OXYGEN SATURATION: 99 % | SYSTOLIC BLOOD PRESSURE: 130 MMHG | HEART RATE: 87 BPM | DIASTOLIC BLOOD PRESSURE: 70 MMHG | HEIGHT: 66 IN | WEIGHT: 183 LBS | BODY MASS INDEX: 29.41 KG/M2 | TEMPERATURE: 97.8 F

## 2023-08-21 DIAGNOSIS — R20.2 NUMBNESS AND TINGLING OF BOTH FEET: ICD-10-CM

## 2023-08-21 DIAGNOSIS — J45.20 MILD INTERMITTENT ASTHMA WITHOUT COMPLICATION: ICD-10-CM

## 2023-08-21 DIAGNOSIS — E11.65 TYPE 2 DIABETES MELLITUS WITH HYPERGLYCEMIA, WITHOUT LONG-TERM CURRENT USE OF INSULIN: ICD-10-CM

## 2023-08-21 DIAGNOSIS — D72.829 LEUKOCYTOSIS, UNSPECIFIED TYPE: ICD-10-CM

## 2023-08-21 DIAGNOSIS — Z00.00 ANNUAL PHYSICAL EXAM: Primary | ICD-10-CM

## 2023-08-21 DIAGNOSIS — R20.0 NUMBNESS AND TINGLING OF BOTH FEET: ICD-10-CM

## 2023-08-21 PROCEDURE — 99396 PREV VISIT EST AGE 40-64: CPT | Performed by: NURSE PRACTITIONER

## 2023-08-21 RX ORDER — ALBUTEROL SULFATE 90 UG/1
2 AEROSOL, METERED RESPIRATORY (INHALATION) EVERY 4 HOURS PRN
Qty: 18 G | Refills: 0 | Status: SHIPPED | OUTPATIENT
Start: 2023-08-21

## 2023-08-21 NOTE — PROGRESS NOTES
Chief Complaint   Patient presents with    Annual Exam    Tingling     Feet/toes 2020 - possible hammer toes left foot second toe       Patient Care Team:  Head, INDRA Ocampo as PCP - General (Nurse Practitioner)  Nate Freeman MD as Consulting Physician (Obstetrics and Gynecology)    Subjective   Ofe Whitehead is a 53 y.o. female and is here for a yearly physical exam. The patient reports problems - Numbness and tingling in feet primarily at night.  Will also cause pain to top of foot.  Usually rolls her feet over something before bed which helps.  She feels she drinks plenty of fluids.   .    Last physical completed 4/19/2019.      Do you take any herbs or supplements that were not prescribed by a doctor? no. If so, these will be added to active medication list.    Health Habits:  Dental Exam: Up to date  Eye Exam: Up to date  Diet: Nothing specific    Exercise:   Current exercise activities include: Walking.    Pap:  6/22/2022 - Followed by [place] provider, Dr. Freeman.    Mammogram: 7/13/2023 negative.    Dexa:  Colonoscopy:Tissue Pathology Exam (07/29/2019 09:13)    The following portions of the patient's history were reviewed and updated as appropriate: allergies, current medications, past family history, past medical history, past social history, past surgical history, and problem list.    Social and Family and Surgical History reviewed and updated today, see Rooming tab.    Health History, Preventive Measures and Vaccination flow sheets reviewed and updated today.    Patient's current medical chart in Epic; including previous office notes, imaging, labs, specialist's evaluation either in notes or in Media tab reviewed today.    Other pertinent medical information also reviewed thru Care Everywhere function is also reviewed today.    Review of Systems  Review of Systems  Vitals:    08/21/23 1437   BP: 130/70   BP Location: Left arm   Patient Position: Sitting   Cuff Size: Adult   Pulse: 87   Temp: 97.8  "øF (36.6 øC)   SpO2: 99%   Weight: 83 kg (183 lb)   Height: 167.6 cm (66\")     Wt Readings from Last 3 Encounters:   08/21/23 83 kg (183 lb)   01/14/21 84.6 kg (186 lb 9.6 oz)   04/19/19 80.3 kg (177 lb)       General Appearance:  Alert, cooperative, no distress, appears stated age   Head:  Normocephalic, without obvious abnormality, atraumatic   Eyes:  PERRL, conjunctiva/corneas clear, EOM's intact.   Ears:  Normal TM's and external ear canals, both ears   Nose: Nares normal, septum midline, mucosa normal, no drainage or sinus tenderness   Throat: Lips, mucosa, and tongue normal; teeth and gums normal   Neck: Supple, symmetrical, trachea midline, no adenopathy;   thyroid: No enlargement/tenderness/nodules       Lungs:  Clear to auscultation bilaterally, respirations even and unlabored   Chest wall:  No tenderness or deformity   Heart:  Regular rate and rhythm, S1 and S2 normal, no murmur, rub or gallop   Abdomen:  Soft, non-tender, bowel sounds active all four quadrants,   no masses, no organomegaly           Extremities: Extremities normal, atraumatic, no cyanosis or edema  Feet:  No abnormality.  Palpable pedal pulses.  No skin color changes.     Pulses: 2+ and symmetric all extremities   Skin: Skin color, texture, turgor normal, no rashes or lesions   Lymph nodes: Cervical and supraclavicular nodes normal   Neurologic: CNII-XII intact. Normal strength.       Patient's (Body mass index is 29.54 kg/mý.) indicates that they are overweight (BMI 25-29.9) with health related conditions that include diabetes mellitus and dyslipidemias . Weight is newly identified. BMI is is above average; BMI management plan is completed. We discussed low calorie, low carb based diet program, portion control, and increasing exercise.     Results for orders placed or performed in visit on 07/17/23   CBC (No Diff)    Specimen: Blood   Result Value Ref Range    WBC 13.40 (H) 3.40 - 10.80 10*3/mm3    RBC 4.82 3.77 - 5.28 10*6/mm3    " Hemoglobin 14.2 12.0 - 15.9 g/dL    Hematocrit 41.7 34.0 - 46.6 %    MCV 86.5 79.0 - 97.0 fL    MCH 29.5 26.6 - 33.0 pg    MCHC 34.1 31.5 - 35.7 g/dL    RDW 12.4 12.3 - 15.4 %    Platelets 310 140 - 450 10*3/mm3   Comprehensive Metabolic Panel    Specimen: Blood   Result Value Ref Range    Glucose 127 (H) 65 - 99 mg/dL    BUN 14 6 - 20 mg/dL    Creatinine 0.80 0.57 - 1.00 mg/dL    EGFR Result 87.7 >60.0 mL/min/1.73    BUN/Creatinine Ratio 17.5 7.0 - 25.0    Sodium 138 136 - 145 mmol/L    Potassium 4.4 3.5 - 5.2 mmol/L    Chloride 98 98 - 107 mmol/L    Total CO2 25.2 22.0 - 29.0 mmol/L    Calcium 9.4 8.6 - 10.5 mg/dL    Total Protein 6.7 6.0 - 8.5 g/dL    Albumin 4.3 3.5 - 5.2 g/dL    Globulin 2.4 gm/dL    A/G Ratio 1.8 g/dL    Total Bilirubin 0.7 0.0 - 1.2 mg/dL    Alkaline Phosphatase 95 39 - 117 U/L    AST (SGOT) 21 1 - 32 U/L    ALT (SGPT) 39 (H) 1 - 33 U/L   Lipid Panel With / Chol / HDL Ratio    Specimen: Blood   Result Value Ref Range    Total Cholesterol 176 0 - 200 mg/dL    Triglycerides 144 0 - 150 mg/dL    HDL Cholesterol 43 40 - 60 mg/dL    VLDL Cholesterol Jimmy 26 5 - 40 mg/dL    LDL Chol Calc ([facility]) 107 (H) 0 - 100 mg/dL    Chol/HDL Ratio 4.09    TSH Rfx On Abnormal To Free T4    Specimen: Blood   Result Value Ref Range    TSH 1.090 0.270 - 4.200 uIU/mL   UA / M With / Rflx Culture([facility] ONLY) - Urine, Clean Catch    Specimen: Urine, Clean Catch   Result Value Ref Range    Specific Gravity, UA 1.010 1.005 - 1.030    pH, UA 6.0 5.0 - 7.5    Color, UA Yellow Yellow    Appearance, UA Clear Clear    Leukocytes, UA Negative Negative    Protein Negative Negative/Trace    Glucose, UA Negative Negative    Ketones Negative Negative    Blood, UA Negative Negative    Bilirubin, UA Negative Negative    Urobilinogen, UA 0.2 0.2 - 1.0 mg/dL    Nitrite, UA Negative Negative    Microscopic Examination Comment     Microscopic Examination See below:     Urinalysis Reflex Comment    Vitamin D,25-Hydroxy    Specimen: Blood    Result Value Ref Range    25 Hydroxy, Vitamin D 27.5 (L) 30.0 - 100.0 ng/ml   Microscopic Examination -   Result Value Ref Range    WBC, UA 0-5 0 - 5 /hpf    RBC, UA None seen 0 - 2 /hpf    Epithelial Cells (non renal) 0-10 0 - 10 /hpf    Casts None seen None seen /lpf    Bacteria, UA Few None seen/Few /hpf   Hemoglobin A1c   Result Value Ref Range    Hemoglobin A1C 7.30 (H) 4.80 - 5.60 %   Written Authorization   Result Value Ref Range    Written Authorization Comment      Assessment & Plan   Healthy female exam.  Diagnoses and all orders for this visit:    1. Annual physical exam (Primary)    2. Mild intermittent asthma without complication  -     albuterol sulfate  (90 Base) MCG/ACT inhaler; Inhale 2 puffs Every 4 (Four) Hours As Needed for Wheezing or Shortness of Air.  Dispense: 18 g; Refill: 0    3. Type 2 diabetes mellitus with hyperglycemia, without long-term current use of insulin  -     Comprehensive Metabolic Panel; Future  -     Hemoglobin A1c; Future    4. Leukocytosis, unspecified type  -     CBC w MANUAL Differential; Future    5. Numbness and tingling of both feet  -     Vitamin B12 & Folate; Future      1. Ofe Whitehead presents the office today for annual physical exam.  Her main concerns is numbness and tingling in both feet which has been ongoing problem.  She had recent blood work completed which was reviewed along with patient.  Elevated fasting glucose of 127.  Hemoglobin A1c elevated at 7.3.  Instructed she now is considered type II diabetic.  Possibly can cause numbness and tingling in feet.  Kidney, liver, thyroid function test are stable.  White blood count elevated at 13.4.  Denies any symptoms such as feeling ill, fever.  No recent steroid use.  Recommend continue to monitor.  Cholesterol levels appear stable with only slightly elevated LDL of 107.  Vitamin D has improved since last checked however still remains low at 27.5.  Recommend continuing vitamin D supplement of about  2000 units daily.  Concerning her new diagnosis of type 2 diabetes, we will start off with changing diet and incorporating exercise into daily routine.  Instructed she needs to decrease her sugar and carb intake.  Exercise with a goal of 150 minutes/week.  Drink plenty of fluids.  We will repeat fasting labs in 3 months for reevaluation.  Also readdress to see if numbness in feet has improved.  Also check vitamin B12 due to deficiency can cause similar symptoms.  2. Patient Counseling:  --Nutrition: Stressed importance of moderation in sodium/caffeine intake, saturated fat and cholesterol.  Discussed caloric balance, sufficient intake of fresh fruits, vegetables, fiber,    calcium, iron.  --Exercise: Stressed the importance of regular exercise.   --Substance Abuse: Discussed cessation/primary prevention of tobacco, alcohol, or other drug use; driving or other dangerous activities under the influence.    --Dental health: Discussed importance of regular tooth brushing, flossing, and dental visits.  --Suggested having eyes and vision checked if needed or past due.  --Immunizations reviewed.  --Discussed benefits of screening colonoscopy.  3. Discussed the patient's BMI with her.  The BMI is above average; BMI management plan is completed  4. Follow up next physical in 1 year    Patient was given instructions and counseling regarding condition or for health maintenance advice.  Please see specific information pulled into the AVS if appropriate.      Medications Discontinued During This Encounter   Medication Reason    VITAMIN D PO *Therapy completed    albuterol (PROVENTIL HFA;VENTOLIN HFA) 108 (90 BASE) MCG/ACT inhaler Reorder          Lilliam Barrientos, INDRA  Family Practice  [facility] [place]

## 2024-01-19 DIAGNOSIS — E11.65 TYPE 2 DIABETES MELLITUS WITH HYPERGLYCEMIA, WITHOUT LONG-TERM CURRENT USE OF INSULIN: Primary | ICD-10-CM

## 2024-01-19 RX ORDER — METFORMIN HYDROCHLORIDE 500 MG/1
500 TABLET, EXTENDED RELEASE ORAL
Qty: 30 TABLET | Refills: 2 | Status: SHIPPED | OUTPATIENT
Start: 2024-01-19

## 2024-02-12 DIAGNOSIS — E11.65 TYPE 2 DIABETES MELLITUS WITH HYPERGLYCEMIA, WITHOUT LONG-TERM CURRENT USE OF INSULIN: ICD-10-CM

## 2024-02-12 RX ORDER — METFORMIN HYDROCHLORIDE 500 MG/1
500 TABLET, EXTENDED RELEASE ORAL
Qty: 90 TABLET | Refills: 0 | Status: SHIPPED | OUTPATIENT
Start: 2024-02-12

## 2024-04-19 DIAGNOSIS — E11.65 TYPE 2 DIABETES MELLITUS WITH HYPERGLYCEMIA, WITHOUT LONG-TERM CURRENT USE OF INSULIN: ICD-10-CM

## 2024-04-19 RX ORDER — METFORMIN HYDROCHLORIDE 500 MG/1
500 TABLET, EXTENDED RELEASE ORAL
Qty: 90 TABLET | Refills: 0 | Status: SHIPPED | OUTPATIENT
Start: 2024-04-19

## 2024-06-26 DIAGNOSIS — E11.65 TYPE 2 DIABETES MELLITUS WITH HYPERGLYCEMIA, WITHOUT LONG-TERM CURRENT USE OF INSULIN: ICD-10-CM

## 2024-06-27 RX ORDER — METFORMIN HYDROCHLORIDE 500 MG/1
500 TABLET, EXTENDED RELEASE ORAL
Qty: 90 TABLET | Refills: 0 | Status: SHIPPED | OUTPATIENT
Start: 2024-06-27

## 2024-07-17 ENCOUNTER — APPOINTMENT (OUTPATIENT)
Dept: WOMENS IMAGING | Facility: HOSPITAL | Age: 54
End: 2024-07-17
Payer: COMMERCIAL

## 2024-07-17 PROCEDURE — 77063 BREAST TOMOSYNTHESIS BI: CPT | Performed by: RADIOLOGY

## 2024-07-17 PROCEDURE — 77067 SCR MAMMO BI INCL CAD: CPT | Performed by: RADIOLOGY

## 2024-08-09 DIAGNOSIS — E11.65 TYPE 2 DIABETES MELLITUS WITH HYPERGLYCEMIA, WITHOUT LONG-TERM CURRENT USE OF INSULIN: ICD-10-CM

## 2024-08-09 DIAGNOSIS — Z00.00 ROUTINE HEALTH MAINTENANCE: Primary | ICD-10-CM

## 2024-08-09 DIAGNOSIS — E55.9 VITAMIN D DEFICIENCY: ICD-10-CM

## 2024-08-16 DIAGNOSIS — E11.65 TYPE 2 DIABETES MELLITUS WITH HYPERGLYCEMIA, WITHOUT LONG-TERM CURRENT USE OF INSULIN: ICD-10-CM

## 2024-08-16 DIAGNOSIS — Z00.00 ROUTINE HEALTH MAINTENANCE: ICD-10-CM

## 2024-08-23 NOTE — PROGRESS NOTES
Chief Complaint   Patient presents with    Annual Exam    Tingling     Bilateral feet     Hemorrhoids              Patient Care Team:  Head, INDRA Ocampo as PCP - General (Nurse Practitioner)  Nate Freeman MD as Consulting Physician (Obstetrics and Gynecology)    Subjective   Ofe Whitehead is a 54 y.o. female and is here for a yearly physical exam. The patient reports problems - flare-up of hemorrhoids.  Primarily occurs with bowel movements.   .    Continues to have neuropathy in feet.  Tried OTC roll-on nerve relief for the 1st time last night which helped.  Will have muscle cramps in top of feet at times.  Noticed increased foot pain when walking long periods.       Diabetes:  Has been tolerating Metformin  mg daily.      Do you take any herbs or supplements that were not prescribed by a doctor? no. If so, these will be added to active medication list.    Health Habits:  Dental Exam: Up to date  Eye Exam: Up to date  Diet: Nothing specific    Exercise:   Current exercise activities include: Walking.    Pap:  6/22/2022 - Followed by [place] provider, Dr. Freeman.    Mammogram: 7/17/2024 negative.    Dexa:  Colonoscopy:Tissue Pathology Exam (07/29/2019 09:13)  Works at [employer].       The following portions of the patient's history were reviewed and updated as appropriate: allergies, current medications, past family history, past medical history, past social history, past surgical history, and problem list.    Social and Family and Surgical History reviewed and updated today, see Rooming tab.    Health History, Preventive Measures and Vaccination flow sheets reviewed and updated today.    Patient's current medical chart in Epic; including previous office notes, imaging, labs, specialist's evaluation either in notes or in Media tab reviewed today.    Other pertinent medical information also reviewed thru Care Everywhere function is also reviewed today.    Review of Systems  Review of Systems  Vitals:     "08/26/24 0833   BP: 134/80   BP Location: Left arm   Patient Position: Sitting   Cuff Size: Adult   Pulse: 84   Temp: 96.9 °F (36.1 °C)   SpO2: 99%   Weight: 81.2 kg (179 lb)   Height: 167.6 cm (66\")     Wt Readings from Last 3 Encounters:   08/26/24 81.2 kg (179 lb)   08/21/23 83 kg (183 lb)   01/14/21 84.6 kg (186 lb 9.6 oz)       Physical Exam  Constitutional:       Appearance: She is well-developed.   HENT:      Head: Normocephalic and atraumatic.      Right Ear: Tympanic membrane normal.      Left Ear: Tympanic membrane normal.   Eyes:      Pupils: Pupils are equal, round, and reactive to light.   Cardiovascular:      Rate and Rhythm: Normal rate and regular rhythm.      Pulses:           Dorsalis pedis pulses are 2+ on the right side and 2+ on the left side.        Posterior tibial pulses are 2+ on the right side and 2+ on the left side.      Heart sounds: Normal heart sounds. No murmur heard.  Pulmonary:      Effort: Pulmonary effort is normal.      Breath sounds: Normal breath sounds. No wheezing, rhonchi or rales.   Abdominal:      General: Bowel sounds are normal.      Palpations: Abdomen is soft.      Tenderness: There is no abdominal tenderness.   Musculoskeletal:         General: No tenderness. Normal range of motion.      Cervical back: Normal range of motion and neck supple.      Right foot: Normal range of motion. No deformity.      Left foot: Normal range of motion. No deformity.   Feet:      Right foot:      Protective Sensation: 10 sites tested.  9 sites sensed.      Skin integrity: Skin integrity normal.      Toenail Condition: Right toenails are normal.      Left foot:      Protective Sensation: 10 sites tested.  9 sites sensed.      Skin integrity: Skin integrity normal.      Toenail Condition: Left toenails are normal.      Comments: Decreased sensation in bilateral great toes.  Skin:     General: Skin is warm and dry.      Findings: No erythema or rash.   Neurological:      Mental Status: She " is alert and oriented to person, place, and time.   Psychiatric:         Behavior: Behavior normal.         BMI is >= 25 and <30. (Overweight) The following options were offered after discussion;: exercise counseling/recommendations, nutrition counseling/recommendations, and information on healthy weight added to patient's after visit summary        Results for orders placed or performed in visit on 08/14/24   Hemoglobin A1c    Specimen: Blood   Result Value Ref Range    Hemoglobin A1C 7.00 (H) 4.80 - 5.60 %   CBC (No Diff)    Specimen: Blood   Result Value Ref Range    WBC 12.72 (H) 3.40 - 10.80 10*3/mm3    RBC 4.85 3.77 - 5.28 10*6/mm3    Hemoglobin 13.8 12.0 - 15.9 g/dL    Hematocrit 42.3 34.0 - 46.6 %    MCV 87.2 79.0 - 97.0 fL    MCH 28.5 26.6 - 33.0 pg    MCHC 32.6 31.5 - 35.7 g/dL    RDW 12.1 (L) 12.3 - 15.4 %    Platelets 308 140 - 450 10*3/mm3   Comprehensive Metabolic Panel    Specimen: Blood   Result Value Ref Range    Glucose 143 (H) 65 - 99 mg/dL    BUN 15 6 - 20 mg/dL    Creatinine 0.77 0.57 - 1.00 mg/dL    EGFR Result 91.2 >60.0 mL/min/1.73    BUN/Creatinine Ratio 19.5 7.0 - 25.0    Sodium 139 136 - 145 mmol/L    Potassium 4.4 3.5 - 5.2 mmol/L    Chloride 101 98 - 107 mmol/L    Total CO2 24.0 22.0 - 29.0 mmol/L    Calcium 9.1 8.6 - 10.5 mg/dL    Total Protein 6.5 6.0 - 8.5 g/dL    Albumin 4.2 3.5 - 5.2 g/dL    Globulin 2.3 gm/dL    A/G Ratio 1.8 g/dL    Total Bilirubin 0.3 0.0 - 1.2 mg/dL    Alkaline Phosphatase 90 39 - 117 U/L    AST (SGOT) 20 1 - 32 U/L    ALT (SGPT) 34 (H) 1 - 33 U/L   Lipid Panel With / Chol / HDL Ratio    Specimen: Blood   Result Value Ref Range    Total Cholesterol 189 0 - 200 mg/dL    Triglycerides 212 (H) 0 - 150 mg/dL    HDL Cholesterol 42 40 - 60 mg/dL    VLDL Cholesterol Jimmy 37 5 - 40 mg/dL    LDL Chol Calc ([facility]) 110 (H) 0 - 100 mg/dL    Chol/HDL Ratio 4.50    TSH Rfx On Abnormal To Free T4    Specimen: Blood   Result Value Ref Range    TSH 1.700 0.270 - 4.200 uIU/mL   UA /  M With / Rflx Culture([facility] ONLY) - Urine, Clean Catch    Specimen: Urine, Clean Catch   Result Value Ref Range    Specific Gravity, UA 1.025 1.005 - 1.030    pH, UA 5.5 5.0 - 7.5    Color, UA Yellow Yellow    Appearance, UA Clear Clear    Leukocytes, UA Negative Negative    Protein Negative Negative/Trace    Glucose, UA Trace (A) Negative    Ketones Negative Negative    Blood, UA Negative Negative    Bilirubin, UA Negative Negative    Urobilinogen, UA 0.2 0.2 - 1.0 mg/dL    Nitrite, UA Negative Negative    Microscopic Examination Comment     Microscopic Examination See below:     Urinalysis Reflex Comment    Vitamin D,25-Hydroxy    Specimen: Blood   Result Value Ref Range    25 Hydroxy, Vitamin D 26.4 (L) 30.0 - 100.0 ng/ml   Microscopic Examination -   Result Value Ref Range    WBC, UA None seen 0 - 5 /hpf    RBC, UA 0-2 0 - 2 /hpf    Epithelial Cells (non renal) 0-10 0 - 10 /hpf    Casts None seen None seen /lpf    Bacteria, UA None seen None seen/Few     Assessment & Plan   Healthy female exam.  Diagnoses and all orders for this visit:    1. Annual physical exam (Primary)    2. Hemorrhoids, unspecified hemorrhoid type    3. Type 2 diabetes mellitus with hyperglycemia, without long-term current use of insulin  -     Ambulatory Referral for Diabetic Eye Exam-Optometry  -     metFORMIN ER (GLUCOPHAGE-XR) 500 MG 24 hr tablet; Take 1 tablet by mouth Daily With Breakfast.  Dispense: 90 tablet; Refill: 0    4. Diabetic mononeuropathy associated with diabetes mellitus due to underlying condition  -     magnesium, as, gluconate (MAGONATE) 500 (27 Mg) MG tablet; Take 1 tablet by mouth 2 (Two) Times a Day.    5. Leukocytosis, unspecified type   Asymptomatic.  Possibly related to allergies.  Monitor for now.      6. Allergic rhinitis, unspecified seasonality, unspecified trigger   Continue Claritin D as needed.  Albuterol inhaler as needed.      Other orders  -     Hydrocortisone, Perianal, (ANUSOL-HC) 2.5 % rectal cream;  Insert  into the rectum 2 (Two) Times a Day.  Dispense: 28 g; Refill: 0      1. Ofe Whitehead continues to have neuropathy in feet.  Reassuring had some relief with roll-on pain reliever.  Advised to also start over-the-counter magnesium supplement of 500 mg twice a day.  This can also help with bowel movements and hopefully prevent flareup of hemorrhoids.  Will have her return in 3 months for recheck to see if symptoms improve.  Reviewed recent labs along with patient.  Her hemoglobin A1c has improved since starting metformin.  Currently at 7.0.  Will continue metformin  mg daily.  Kidney and liver function test are stable.  Triglycerides are elevated however admits to increased sugary alcohol drinks the Saturday prior to blood work.  Tries to watch her sugar and carb intake for the most part.  Will continue to monitor.  Her white count is elevated at 12.72.  Has been as high as 13.4 a year ago.  Does suffer from allergies.  Currently asymptomatic with no fever or other symptoms.  Advised we will continue to monitor.  Blood pressure stable at 134/80.  No changes in medications at this time.  Will recheck CBC with manual differential, CMP, hemoglobin A1c, and magnesium level during next office visit.  2. Patient Counseling:  --Nutrition: Stressed importance of moderation in sodium/caffeine intake, saturated fat and cholesterol.  Discussed caloric balance, sufficient intake of fresh fruits, vegetables, fiber,    calcium, iron.  --Exercise: Stressed the importance of regular exercise.   --Substance Abuse: Discussed cessation/primary prevention of tobacco, alcohol, or other drug use; driving or other dangerous activities under the influence.    --Dental health: Discussed importance of regular tooth brushing, flossing, and dental visits.  --Suggested having eyes and vision checked if needed or past due.  --Immunizations reviewed.  --Discussed benefits of screening colonoscopy.  Up-to-date.  3. Discussed the  patient's BMI with her.  The BMI is above average; BMI management plan is completed  4. Follow up next physical in 1 year    Patient was given instructions and counseling regarding condition or for health maintenance advice.  Please see specific information pulled into the AVS if appropriate.      Medications Discontinued During This Encounter   Medication Reason    metFORMIN ER (GLUCOPHAGE-XR) 500 MG 24 hr tablet Reorder          INDRA Goldberg  Family Practice  [facility] [place]

## 2024-08-26 ENCOUNTER — OFFICE VISIT (OUTPATIENT)
Dept: FAMILY MEDICINE CLINIC | Facility: CLINIC | Age: 54
End: 2024-08-26
Payer: COMMERCIAL

## 2024-08-26 VITALS
WEIGHT: 179 LBS | BODY MASS INDEX: 28.77 KG/M2 | HEIGHT: 66 IN | OXYGEN SATURATION: 99 % | TEMPERATURE: 96.9 F | DIASTOLIC BLOOD PRESSURE: 80 MMHG | HEART RATE: 84 BPM | SYSTOLIC BLOOD PRESSURE: 134 MMHG

## 2024-08-26 DIAGNOSIS — E08.41 DIABETIC MONONEUROPATHY ASSOCIATED WITH DIABETES MELLITUS DUE TO UNDERLYING CONDITION: ICD-10-CM

## 2024-08-26 DIAGNOSIS — Z00.00 ANNUAL PHYSICAL EXAM: Primary | ICD-10-CM

## 2024-08-26 DIAGNOSIS — J30.9 ALLERGIC RHINITIS, UNSPECIFIED SEASONALITY, UNSPECIFIED TRIGGER: ICD-10-CM

## 2024-08-26 DIAGNOSIS — K64.9 HEMORRHOIDS, UNSPECIFIED HEMORRHOID TYPE: ICD-10-CM

## 2024-08-26 DIAGNOSIS — D72.829 LEUKOCYTOSIS, UNSPECIFIED TYPE: ICD-10-CM

## 2024-08-26 DIAGNOSIS — E11.65 TYPE 2 DIABETES MELLITUS WITH HYPERGLYCEMIA, WITHOUT LONG-TERM CURRENT USE OF INSULIN: ICD-10-CM

## 2024-08-26 PROCEDURE — 99396 PREV VISIT EST AGE 40-64: CPT | Performed by: NURSE PRACTITIONER

## 2024-08-26 RX ORDER — UREA 10 %
500 LOTION (ML) TOPICAL 2 TIMES DAILY
COMMUNITY
Start: 2024-08-26

## 2024-08-26 RX ORDER — HYDROCORTISONE 25 MG/G
CREAM TOPICAL 2 TIMES DAILY
Qty: 28 G | Refills: 0 | Status: SHIPPED | OUTPATIENT
Start: 2024-08-26

## 2024-08-26 RX ORDER — METFORMIN HCL 500 MG
500 TABLET, EXTENDED RELEASE 24 HR ORAL
Qty: 90 TABLET | Refills: 0 | Status: SHIPPED | OUTPATIENT
Start: 2024-08-26

## 2024-08-26 NOTE — PATIENT INSTRUCTIONS
Start over-the-counter magnesium gluconate 500 mg twice a day to see if helps with neuropathy.

## 2024-09-12 DIAGNOSIS — E11.65 TYPE 2 DIABETES MELLITUS WITH HYPERGLYCEMIA, WITHOUT LONG-TERM CURRENT USE OF INSULIN: ICD-10-CM

## 2024-09-12 RX ORDER — METFORMIN HCL 500 MG
500 TABLET, EXTENDED RELEASE 24 HR ORAL
Qty: 90 TABLET | Refills: 3 | OUTPATIENT
Start: 2024-09-12

## 2024-09-22 DIAGNOSIS — E11.65 TYPE 2 DIABETES MELLITUS WITH HYPERGLYCEMIA, WITHOUT LONG-TERM CURRENT USE OF INSULIN: ICD-10-CM

## 2024-09-23 RX ORDER — METFORMIN HCL 500 MG
500 TABLET, EXTENDED RELEASE 24 HR ORAL
Qty: 90 TABLET | Refills: 3 | OUTPATIENT
Start: 2024-09-23

## 2024-09-27 DIAGNOSIS — E11.65 TYPE 2 DIABETES MELLITUS WITH HYPERGLYCEMIA, WITHOUT LONG-TERM CURRENT USE OF INSULIN: ICD-10-CM

## 2024-09-30 RX ORDER — METFORMIN HCL 500 MG
500 TABLET, EXTENDED RELEASE 24 HR ORAL
Qty: 90 TABLET | Refills: 0 | Status: SHIPPED | OUTPATIENT
Start: 2024-09-30

## 2024-11-25 ENCOUNTER — LAB (OUTPATIENT)
Dept: LAB | Facility: HOSPITAL | Age: 54
End: 2024-11-25
Payer: COMMERCIAL

## 2024-11-25 ENCOUNTER — OFFICE VISIT (OUTPATIENT)
Dept: FAMILY MEDICINE CLINIC | Facility: CLINIC | Age: 54
End: 2024-11-25
Payer: COMMERCIAL

## 2024-11-25 VITALS
DIASTOLIC BLOOD PRESSURE: 80 MMHG | TEMPERATURE: 97.8 F | HEART RATE: 75 BPM | OXYGEN SATURATION: 99 % | BODY MASS INDEX: 28.77 KG/M2 | WEIGHT: 179 LBS | HEIGHT: 66 IN | SYSTOLIC BLOOD PRESSURE: 138 MMHG

## 2024-11-25 DIAGNOSIS — K62.5 RECTAL BLEEDING: ICD-10-CM

## 2024-11-25 DIAGNOSIS — Z86.39 HISTORY OF IRON DEFICIENCY: ICD-10-CM

## 2024-11-25 DIAGNOSIS — Z12.11 ENCOUNTER FOR HEMOCCULT SCREENING: ICD-10-CM

## 2024-11-25 DIAGNOSIS — Z11.59 ENCOUNTER FOR HEPATITIS C SCREENING TEST FOR LOW RISK PATIENT: ICD-10-CM

## 2024-11-25 DIAGNOSIS — R20.0 NUMBNESS AND TINGLING OF BOTH FEET: ICD-10-CM

## 2024-11-25 DIAGNOSIS — D72.829 LEUKOCYTOSIS, UNSPECIFIED TYPE: ICD-10-CM

## 2024-11-25 DIAGNOSIS — R20.2 NUMBNESS AND TINGLING OF BOTH FEET: ICD-10-CM

## 2024-11-25 DIAGNOSIS — D72.829 LEUKOCYTOSIS, UNSPECIFIED TYPE: Primary | ICD-10-CM

## 2024-11-25 DIAGNOSIS — Z12.11 ENCOUNTER FOR SCREENING FOR MALIGNANT NEOPLASM OF COLON: ICD-10-CM

## 2024-11-25 DIAGNOSIS — E11.65 TYPE 2 DIABETES MELLITUS WITH HYPERGLYCEMIA, WITHOUT LONG-TERM CURRENT USE OF INSULIN: Primary | ICD-10-CM

## 2024-11-25 LAB
ALBUMIN SERPL-MCNC: 4.2 G/DL (ref 3.5–5.2)
ALBUMIN UR-MCNC: <1.2 MG/DL
ALBUMIN/GLOB SERPL: 1.8 G/DL
ALP SERPL-CCNC: 91 U/L (ref 39–117)
ALT SERPL W P-5'-P-CCNC: 50 U/L (ref 1–33)
ANION GAP SERPL CALCULATED.3IONS-SCNC: 15 MMOL/L (ref 5–15)
ANISOCYTOSIS BLD QL: ABNORMAL
AST SERPL-CCNC: 28 U/L (ref 1–32)
BASOPHILS # BLD MANUAL: 0 10*3/MM3 (ref 0–0.2)
BASOPHILS NFR BLD MANUAL: 0 % (ref 0–1.5)
BILIRUB SERPL-MCNC: 0.6 MG/DL (ref 0–1.2)
BUN SERPL-MCNC: 13 MG/DL (ref 6–20)
BUN/CREAT SERPL: 15.1 (ref 7–25)
CALCIUM SPEC-SCNC: 9 MG/DL (ref 8.6–10.5)
CHLORIDE SERPL-SCNC: 99 MMOL/L (ref 98–107)
CO2 SERPL-SCNC: 22 MMOL/L (ref 22–29)
CREAT SERPL-MCNC: 0.86 MG/DL (ref 0.57–1)
CREAT UR-MCNC: 79.5 MG/DL
DEPRECATED RDW RBC AUTO: 39.4 FL (ref 37–54)
DEVELOPER EXPIRATION DATE: NORMAL
DEVELOPER LOT NUMBER: NORMAL
EGFRCR SERPLBLD CKD-EPI 2021: 79.9 ML/MIN/1.73
EOSINOPHIL # BLD MANUAL: 0 10*3/MM3 (ref 0–0.4)
EOSINOPHIL NFR BLD MANUAL: 0 % (ref 0.3–6.2)
ERYTHROCYTE [DISTWIDTH] IN BLOOD BY AUTOMATED COUNT: 12.3 % (ref 12.3–15.4)
EXPIRATION DATE: NORMAL
FECAL OCCULT BLOOD SCREEN, POC: NEGATIVE
GLOBULIN UR ELPH-MCNC: 2.4 GM/DL
GLUCOSE SERPL-MCNC: 128 MG/DL (ref 65–99)
HBA1C MFR BLD: 7.1 % (ref 4.8–5.6)
HCT VFR BLD AUTO: 41.2 % (ref 34–46.6)
HGB BLD-MCNC: 13.6 G/DL (ref 12–15.9)
IRON 24H UR-MRATE: 120 MCG/DL (ref 37–145)
IRON SATN MFR SERPL: 31 % (ref 20–50)
LYMPHOCYTES # BLD MANUAL: 2.96 10*3/MM3 (ref 0.7–3.1)
LYMPHOCYTES NFR BLD MANUAL: 4 % (ref 5–12)
Lab: NORMAL
MAGNESIUM SERPL-MCNC: 2.3 MG/DL (ref 1.6–2.6)
MCH RBC QN AUTO: 29 PG (ref 26.6–33)
MCHC RBC AUTO-ENTMCNC: 33 G/DL (ref 31.5–35.7)
MCV RBC AUTO: 87.8 FL (ref 79–97)
MICROALBUMIN/CREAT UR: NORMAL MG/G{CREAT}
MONOCYTES # BLD: 0.46 10*3/MM3 (ref 0.1–0.9)
NEGATIVE CONTROL: NEGATIVE
NEUTROPHILS # BLD AUTO: 7.97 10*3/MM3 (ref 1.7–7)
NEUTROPHILS NFR BLD MANUAL: 70 % (ref 42.7–76)
PATHOLOGY REVIEW: YES
PLAT MORPH BLD: NORMAL
PLATELET # BLD AUTO: 311 10*3/MM3 (ref 140–450)
PMV BLD AUTO: 11.1 FL (ref 6–12)
POLYCHROMASIA BLD QL SMEAR: ABNORMAL
POSITIVE CONTROL: POSITIVE
POTASSIUM SERPL-SCNC: 3.7 MMOL/L (ref 3.5–5.2)
PROT SERPL-MCNC: 6.6 G/DL (ref 6–8.5)
RBC # BLD AUTO: 4.69 10*6/MM3 (ref 3.77–5.28)
SODIUM SERPL-SCNC: 136 MMOL/L (ref 136–145)
TIBC SERPL-MCNC: 393 MCG/DL (ref 298–536)
TRANSFERRIN SERPL-MCNC: 264 MG/DL (ref 200–360)
VARIANT LYMPHS NFR BLD MANUAL: 26 % (ref 19.6–45.3)
VIT B12 BLD-MCNC: 537 PG/ML (ref 211–946)
WBC MORPH BLD: NORMAL
WBC NRBC COR # BLD AUTO: 11.38 10*3/MM3 (ref 3.4–10.8)

## 2024-11-25 PROCEDURE — 83735 ASSAY OF MAGNESIUM: CPT | Performed by: NURSE PRACTITIONER

## 2024-11-25 PROCEDURE — 80053 COMPREHEN METABOLIC PANEL: CPT | Performed by: NURSE PRACTITIONER

## 2024-11-25 PROCEDURE — 82570 ASSAY OF URINE CREATININE: CPT | Performed by: NURSE PRACTITIONER

## 2024-11-25 PROCEDURE — 82043 UR ALBUMIN QUANTITATIVE: CPT | Performed by: NURSE PRACTITIONER

## 2024-11-25 PROCEDURE — 99214 OFFICE O/P EST MOD 30 MIN: CPT | Performed by: NURSE PRACTITIONER

## 2024-11-25 PROCEDURE — 83036 HEMOGLOBIN GLYCOSYLATED A1C: CPT | Performed by: NURSE PRACTITIONER

## 2024-11-25 PROCEDURE — 82270 OCCULT BLOOD FECES: CPT | Performed by: NURSE PRACTITIONER

## 2024-11-25 PROCEDURE — 36415 COLL VENOUS BLD VENIPUNCTURE: CPT | Performed by: NURSE PRACTITIONER

## 2024-11-25 PROCEDURE — 85027 COMPLETE CBC AUTOMATED: CPT | Performed by: NURSE PRACTITIONER

## 2024-11-25 PROCEDURE — 82607 VITAMIN B-12: CPT | Performed by: NURSE PRACTITIONER

## 2024-11-25 PROCEDURE — 85007 BL SMEAR W/DIFF WBC COUNT: CPT | Performed by: NURSE PRACTITIONER

## 2024-11-25 PROCEDURE — 84466 ASSAY OF TRANSFERRIN: CPT | Performed by: NURSE PRACTITIONER

## 2024-11-25 PROCEDURE — 83540 ASSAY OF IRON: CPT | Performed by: NURSE PRACTITIONER

## 2024-11-25 PROCEDURE — 86803 HEPATITIS C AB TEST: CPT | Performed by: NURSE PRACTITIONER

## 2024-11-25 NOTE — PROGRESS NOTES
Answers submitted by the patient for this visit:  Primary Reason for Visit (Submitted on 11/24/2024)  What is the primary reason for your visit?: Problem Not Listed  Problem not listed (Submitted on 11/24/2024)  Chief Complaint: Other medical problem  Reason for appointment: Follow up to previous appt for diabetes/hemorrhoid bleeding  abdominal pain: No  anorexia: No  joint pain: No  change in stool: No  chest pain: No  chills: No  nasal congestion: No  cough: No  diaphoresis: No  fatigue: No  fever: No  headaches: No  joint swelling: No  myalgias: No  nausea: No  neck pain: No  numbness: Yes  rash: No  sore throat: No  swollen glands: No  dysuria: No  vertigo: No  visual change: No  vomiting: No  weakness: No  Onset: 6 to 12 months  Chronicity: recurrent  Frequency: constantly  Medications tried: Hemorrhoid cream  Patient ID: Ofe Whitehead is a 54 y.o. female     Patient Care Team:  Head, INDRA Ocampo as PCP - General (Nurse Practitioner)  Nate Freeman MD as Consulting Physician (Obstetrics and Gynecology)    Subjective     Chief Complaint   Patient presents with    Diabetes       History of Present Illness    Ofe Whitehead presents to [facility] today for continued management concerning type 2 diabetes..     History of Present Illness  The patient presents for evaluation of multiple medical concerns.    She continues to struggle with hemorrhoids, which seem to respond slightly to the cream, but the relief is short-lived. She has noticed small clots of blood when wiping, similar to what she experiences during her menstrual cycle. This occurs sporadically and is can be severe enough to change the color of the toilet water. She is uncertain if the blood is from the hemorrhoid or another source. Her last colonoscopy in 2019 showed no significant findings. She has found some relief with Preparation H. She reports no current irritation and does not feel unwell. She is currently  taking iron supplements twice a week.      She reports a slight improvement in her neuropathy, but it still causes significant discomfort at night. She has been taking magnesium gluconate. The neuropathy is primarily in her big toes, but it affects her entire foot. She occasionally experiences shooting pain in her big toe and the one next to it, which then subsides. She does not monitor her blood sugar levels. Her hemoglobin A1c has improved. She is currently taking metformin once daily, along with probiotics, vitamin D, and magnesium. She also takes Zyrtec-D and uses albuterol as needed.    Results  Laboratory Studies  White blood cell count was slightly elevated at 12. Hemoglobin A1c was 7.       She denies any complaints of fever, chills, cough, chest pain, shortness of air, abdominal pain, nausea, or any other concerns.     The following portions of the patient's history were reviewed and updated as appropriate: allergies, current medications, past family history, past medical history, past social history, past surgical history and problem list.       ROS    Vitals:    11/25/24 0848   BP: 138/80   Pulse: 75   Temp: 97.8 °F (36.6 °C)   SpO2: 99%       Documented weights    11/25/24 0848   Weight: 81.2 kg (179 lb)     Body mass index is 28.89 kg/m².    Results for orders placed or performed in visit on 11/25/24   Comprehensive Metabolic Panel    Collection Time: 11/25/24 10:42 AM    Specimen: Blood   Result Value Ref Range    Glucose 128 (H) 65 - 99 mg/dL    BUN 13 6 - 20 mg/dL    Creatinine 0.86 0.57 - 1.00 mg/dL    Sodium 136 136 - 145 mmol/L    Potassium 3.7 3.5 - 5.2 mmol/L    Chloride 99 98 - 107 mmol/L    CO2 22.0 22.0 - 29.0 mmol/L    Calcium 9.0 8.6 - 10.5 mg/dL    Total Protein 6.6 6.0 - 8.5 g/dL    Albumin 4.2 3.5 - 5.2 g/dL    ALT (SGPT) 50 (H) 1 - 33 U/L    AST (SGOT) 28 1 - 32 U/L    Alkaline Phosphatase 91 39 - 117 U/L    Total Bilirubin 0.6 0.0 - 1.2 mg/dL    Globulin 2.4 gm/dL    A/G Ratio 1.8 g/dL     BUN/Creatinine Ratio 15.1 7.0 - 25.0    Anion Gap 15.0 5.0 - 15.0 mmol/L    eGFR 79.9 >60.0 mL/min/1.73   Hemoglobin A1c    Collection Time: 11/25/24 10:42 AM    Specimen: Blood   Result Value Ref Range    Hemoglobin A1C 7.10 (H) 4.80 - 5.60 %   Magnesium    Collection Time: 11/25/24 10:42 AM    Specimen: Blood   Result Value Ref Range    Magnesium 2.3 1.6 - 2.6 mg/dL   Vitamin B12    Collection Time: 11/25/24 10:42 AM    Specimen: Blood   Result Value Ref Range    Vitamin B-12 537 211 - 946 pg/mL   Iron and TIBC    Collection Time: 11/25/24 10:42 AM    Specimen: Blood   Result Value Ref Range    Iron 120 37 - 145 mcg/dL    Iron Saturation (TSAT) 31 20 - 50 %    Transferrin 264 200 - 360 mg/dL    TIBC 393 298 - 536 mcg/dL   HCV Antibody Rfx To Qnt PCR    Collection Time: 11/25/24 10:42 AM    Specimen: Blood   Result Value Ref Range    Hepatitis C Ab Non Reactive Non Reactive   Manual Differential    Collection Time: 11/25/24 10:42 AM    Specimen: Blood   Result Value Ref Range    Neutrophil % 70.0 42.7 - 76.0 %    Lymphocyte % 26.0 19.6 - 45.3 %    Monocyte % 4.0 (L) 5.0 - 12.0 %    Eosinophil % 0.0 (L) 0.3 - 6.2 %    Basophil % 0.0 0.0 - 1.5 %    Neutrophils Absolute 7.97 (H) 1.70 - 7.00 10*3/mm3    Lymphocytes Absolute 2.96 0.70 - 3.10 10*3/mm3    Monocytes Absolute 0.46 0.10 - 0.90 10*3/mm3    Eosinophils Absolute 0.00 0.00 - 0.40 10*3/mm3    Basophils Absolute 0.00 0.00 - 0.20 10*3/mm3    Anisocytosis Slight/1+ None Seen    Polychromasia Slight/1+ None Seen    WBC Morphology Normal Normal    Platelet Morphology Normal Normal   CBC Auto Differential    Collection Time: 11/25/24 10:42 AM    Specimen: Blood   Result Value Ref Range    WBC 11.38 (H) 3.40 - 10.80 10*3/mm3    RBC 4.69 3.77 - 5.28 10*6/mm3    Hemoglobin 13.6 12.0 - 15.9 g/dL    Hematocrit 41.2 34.0 - 46.6 %    MCV 87.8 79.0 - 97.0 fL    MCH 29.0 26.6 - 33.0 pg    MCHC 33.0 31.5 - 35.7 g/dL    RDW 12.3 12.3 - 15.4 %    RDW-SD 39.4 37.0 - 54.0 fl     MPV 11.1 6.0 - 12.0 fL    Platelets 311 140 - 450 10*3/mm3   Hepatitis C Virus Interpretation    Collection Time: 11/25/24 10:42 AM    Specimen: Blood   Result Value Ref Range    Interpretation Comment    POCT occult blood x 1 stool    Collection Time: 11/25/24 11:15 AM    Specimen: Stool   Result Value Ref Range    Fecal Occult Blood Negative Negative    Lot Number [id number]     Expiration Date 7-31-25     DEVELOPER LOT NUMBER [id number]     DEVELOPER EXPIRATION DATE 7-31-25     Positive Control Positive Positive    Negative Control Negative Negative           Objective     Physical Exam  Vitals reviewed.   Constitutional:       General: She is not in acute distress.  HENT:      Head: Normocephalic and atraumatic.      Nose: No congestion.   Cardiovascular:      Rate and Rhythm: Normal rate and regular rhythm.      Heart sounds: No murmur heard.  Pulmonary:      Effort: Pulmonary effort is normal.      Breath sounds: Normal breath sounds. No wheezing or rhonchi.   Abdominal:      General: Bowel sounds are normal. There is no distension.      Palpations: Abdomen is soft.      Tenderness: There is no abdominal tenderness.   Genitourinary:     Rectum: Normal. Guaiac result negative. External hemorrhoid (non-thrombosed) present. No mass, tenderness or anal fissure. Normal anal tone.      Comments: Chaperoned per Dedra Bellamy MA  Musculoskeletal:      Cervical back: Normal range of motion.   Lymphadenopathy:      Cervical: No cervical adenopathy.   Neurological:      Mental Status: She is alert.       POCT occult blood x 1 stool (11/25/2024 11:15)    Physical Exam             Assessment & Plan     Assessment/Plan     Diagnoses and all orders for this visit:    1. Type 2 diabetes mellitus with hyperglycemia, without long-term current use of insulin (Primary)  -     Hemoglobin A1c    2. Leukocytosis, unspecified type  -     CBC w MANUAL Differential    3. Numbness and tingling of both feet  -     Comprehensive Metabolic  Panel  -     Magnesium  -     Vitamin B12    4. History of iron deficiency  -     Iron and TIBC    5. Encounter for hepatitis C screening test for low risk patient  -     HCV Antibody Rfx To Qnt PCR  -     Hepatitis C Virus Interpretation    6. Encounter for Hemoccult screening  -     POCT occult blood x 1 stool    7. Encounter for screening for malignant neoplasm of colon    8. Rectal bleeding  -     Ambulatory Referral to Gastroenterology        Assessment & Plan  1. Hemorrhoids.  She continues to experience significant bleeding despite the use of prescription cream. She reports occasional blood clots when wiping and sometimes enough blood to change the color of the water in the toilet. Hemoccult negative in office today.  Referral to GI for further evaluation.      2. Elevated white blood cell count.  Her white blood cell count was previously elevated at 12, possibly related to allergies. She has not felt sick. Her white blood cell count will be rechecked today.    3. Neuropathy.  She reports that her neuropathy is slightly better with magnesium but still bothers her at night. She is currently taking magnesium gluconate. Her B12 levels were last checked in January 2024 and will be rechecked today. As a diabetic, she is at increased risk for neuropathy. Duloxetine will be considered if there is no improvement with current management.    4. Diabetes Mellitus.  Her hemoglobin A1c was previously 7. She is currently taking one metformin a day. She does not check her blood sugars regularly. She is advised to continue her current medication regimen.    5. Iron supplementation.  She is taking iron supplements twice a week. Her iron levels will be rechecked today to determine if supplementation is still necessary.        Follow Up:  Return if symptoms worsen or fail to improve.    In the meantime, instructed to contact us sooner for any problems or concerns.    Patient was given instructions and counseling regarding  condition or for health maintenance advice.  Please see specific information pulled into the AVS if appropriate.      Patient or patient representative verbalized consent for the use of Ambient Listening during the visit with  INDRA Alba for chart documentation. 11/26/2024  14:47 EST    INDRA Alba  Family Medicine  [facility]  11/26/24  14:47 EST

## 2024-11-26 LAB
HCV AB SERPL QL IA: NORMAL
HCV IGG SERPL QL IA: NON REACTIVE
LAB AP CASE REPORT: NORMAL
PATH REPORT.FINAL DX SPEC: NORMAL

## 2024-12-01 DIAGNOSIS — E11.65 TYPE 2 DIABETES MELLITUS WITH HYPERGLYCEMIA, WITHOUT LONG-TERM CURRENT USE OF INSULIN: ICD-10-CM

## 2024-12-02 RX ORDER — METFORMIN HYDROCHLORIDE 500 MG/1
500 TABLET, EXTENDED RELEASE ORAL
Qty: 90 TABLET | Refills: 1 | Status: SHIPPED | OUTPATIENT
Start: 2024-12-02

## 2024-12-11 ENCOUNTER — LAB (OUTPATIENT)
Dept: LAB | Facility: HOSPITAL | Age: 54
End: 2024-12-11
Payer: COMMERCIAL

## 2024-12-11 ENCOUNTER — OFFICE VISIT (OUTPATIENT)
Dept: GASTROENTEROLOGY | Facility: CLINIC | Age: 54
End: 2024-12-11
Payer: COMMERCIAL

## 2024-12-11 VITALS
HEIGHT: 66 IN | BODY MASS INDEX: 29.28 KG/M2 | SYSTOLIC BLOOD PRESSURE: 140 MMHG | WEIGHT: 182.2 LBS | DIASTOLIC BLOOD PRESSURE: 78 MMHG

## 2024-12-11 DIAGNOSIS — R74.01 ELEVATED ALT MEASUREMENT: ICD-10-CM

## 2024-12-11 DIAGNOSIS — K62.5 RECTAL BLEEDING: ICD-10-CM

## 2024-12-11 DIAGNOSIS — R74.01 ELEVATED ALT MEASUREMENT: Primary | ICD-10-CM

## 2024-12-11 LAB — HBV SURFACE AG SERPL QL IA: NORMAL

## 2024-12-11 PROCEDURE — 82247 BILIRUBIN TOTAL: CPT

## 2024-12-11 PROCEDURE — 86381 MITOCHONDRIAL ANTIBODY EACH: CPT

## 2024-12-11 PROCEDURE — 82103 ALPHA-1-ANTITRYPSIN TOTAL: CPT

## 2024-12-11 PROCEDURE — 82977 ASSAY OF GGT: CPT

## 2024-12-11 PROCEDURE — 82785 ASSAY OF IGE: CPT

## 2024-12-11 PROCEDURE — 83883 ASSAY NEPHELOMETRY NOT SPEC: CPT

## 2024-12-11 PROCEDURE — 84450 TRANSFERASE (AST) (SGOT): CPT

## 2024-12-11 PROCEDURE — 82784 ASSAY IGA/IGD/IGG/IGM EACH: CPT

## 2024-12-11 PROCEDURE — 84460 ALANINE AMINO (ALT) (SGPT): CPT

## 2024-12-11 PROCEDURE — 86038 ANTINUCLEAR ANTIBODIES: CPT

## 2024-12-11 PROCEDURE — 82947 ASSAY GLUCOSE BLOOD QUANT: CPT

## 2024-12-11 PROCEDURE — 83010 ASSAY OF HAPTOGLOBIN QUANT: CPT

## 2024-12-11 PROCEDURE — 87340 HEPATITIS B SURFACE AG IA: CPT

## 2024-12-11 PROCEDURE — 86015 ACTIN ANTIBODY EACH: CPT

## 2024-12-11 PROCEDURE — 82172 ASSAY OF APOLIPOPROTEIN: CPT

## 2024-12-11 PROCEDURE — 82104 ALPHA-1-ANTITRYPSIN PHENO: CPT

## 2024-12-11 PROCEDURE — 82465 ASSAY BLD/SERUM CHOLESTEROL: CPT

## 2024-12-11 PROCEDURE — 36415 COLL VENOUS BLD VENIPUNCTURE: CPT

## 2024-12-11 PROCEDURE — 84478 ASSAY OF TRIGLYCERIDES: CPT

## 2024-12-11 NOTE — PROGRESS NOTES
"    PATIENT INFORMATION  Ofe Whitehead       - 1970    CHIEF COMPLAINT  Chief Complaint   Patient presents with    Rectal Bleeding       HISTORY OF PRESENT ILLNESS    Here today for evaluation of rectal bleeding    Bleeding for last 5 months, worse the last 1.5-2 most. Has more than 1 hemorrhoid, times there is a lot in toilet water. Holds tissue until stops and then nothing in underwear. Has noticed clots before. Anusol shrank hemorrhoids and bleeding would stop, then a week later would start. Bowels move 2 times a day, not usually hard, sometimes \"relaxes on toilet\" 10-15 minutes. No straining. Ibuprofen occasionally, 3 times a month. No fiber or probiotic. Can have urgency after she eats. Reviewed fiber, fluids, anusol, decrease time sitting in toilet.    CBC with mild elevation WBC this yr, H/H and iron studies are normal.    Mild elevation of ALT is chronic, 50 with most recent check, AST and ALP normal. HCV negative.     Mild HB maybe 2 days a week, resolves with milk, non smoker. Always related to certain foods and reviewed to pretreat triggers.        REVIEWED PERTINENT RESULTS/ LABS  Lab Results   Component Value Date    CASEREPORT  2024     Surgical Pathology Report                         Case: [id number]                                  Authorizing Provider:  Head, INDRA Ocampo          Collected:           2024 10:42 AM          Ordering Location:     [facility]   Received:            2024 04:58 PM                                 LABORATORY                                                                   Pathologist:           Varghese Yap MD                                                         Specimen:    Blood, Venous Line, Peripheral Blood Smears                                                FINALDX  2024     1.  Peripheral blood smear review:   -A.  Leukocytosis with absolute neutrophilia    Comment: There is a leukocytosis with absolute " neutrophilia.  Leukocytosis of this degree and morphology can be seen with inflammation or infection.  Persistent unexplained leukocytosis generally requires further investigation       Lab Results   Component Value Date    HGB 13.6 2024    MCV 87.8 2024     2024    ALT 50 (H) 2024    AST 28 2024    HGBA1C 7.10 (H) 2024    TRIG 212 (H) 2024    IRON 120 2024    TIBC 393 2024      No results found.    REVIEW OF SYSTEMS  Review of Systems      ACTIVE PROBLEMS  Patient Active Problem List    Diagnosis     Rectal bleeding [K62.5]     Vitamin D deficiency [E55.9]     Type 2 diabetes mellitus [E11.9]     Ear ache [H92.09]     Frontal sinusitis [J32.1]     Chest congestion [R09.89]     Cough [R05.9]          PAST MEDICAL HISTORY  Past Medical History:   Diagnosis Date    Allergic     Asthma     Colon polyp     Diabetes mellitus     History of medical problems     Hypoglycemia    Hypoglycemia     Irritable bowel syndrome          SURGICAL HISTORY  Past Surgical History:   Procedure Laterality Date     SECTION      TUBAL ABDOMINAL LIGATION           FAMILY HISTORY  Family History   Problem Relation Age of Onset    Diabetes Mother     Hypertension Mother     Breast cancer Mother     Heart disease Father     COPD Father     Nephrolithiasis Father     Asthma Father     Colon cancer Neg Hx     Colon polyps Neg Hx          SOCIAL HISTORY  Social History     Occupational History    Not on file   Tobacco Use    Smoking status: Never    Smokeless tobacco: Never   Vaping Use    Vaping status: Never Used   Substance and Sexual Activity    Alcohol use: No    Drug use: No    Sexual activity: Yes     Partners: Male     Birth control/protection: None         CURRENT MEDICATIONS    Current Outpatient Medications:     albuterol sulfate  (90 Base) MCG/ACT inhaler, Inhale 2 puffs Every 4 (Four) Hours As Needed for Wheezing or Shortness of Air., Disp: 18  "g, Rfl: 0    cetirizine-pseudoephedrine (ZyrTEC-D) 5-120 MG per 12 hr tablet, Take 1 tablet by mouth 2 (Two) Times a Day., Disp: , Rfl:     Ferrous Sulfate (IRON PO), Take  by mouth 2 (Two) Times a Week., Disp: , Rfl:     Hydrocortisone, Perianal, (ANUSOL-HC) 2.5 % rectal cream, Insert  into the rectum 2 (Two) Times a Day., Disp: 28 g, Rfl: 0    magnesium, as, gluconate (MAGONATE) 500 (27 Mg) MG tablet, Take 1 tablet by mouth 2 (Two) Times a Day. (Patient taking differently: Take 1 tablet by mouth 2 (Two) Times a Week.), Disp: , Rfl:     metFORMIN ER (GLUCOPHAGE-XR) 500 MG 24 hr tablet, TAKE 1 TABLET BY MOUTH DAILY  WITH BREAKFAST, Disp: 90 tablet, Rfl: 1    Probiotic Product (ALIGN PO), Take  by mouth., Disp: , Rfl:     VITAMIN D PO, Take  by mouth., Disp: , Rfl:     ALLERGIES  Patient has no known allergies.    VITALS  Vitals:    12/11/24 0929   BP: 140/78   BP Location: Left arm   Patient Position: Sitting   Cuff Size: Adult   Weight: 82.6 kg (182 lb 3.2 oz)   Height: 167.6 cm (66\")       PHYSICAL EXAM  Debilities/Disabilities Identified: None  Emotional Behavior: Appropriate  Wt Readings from Last 3 Encounters:   12/11/24 82.6 kg (182 lb 3.2 oz)   11/25/24 81.2 kg (179 lb)   08/26/24 81.2 kg (179 lb)     Ht Readings from Last 1 Encounters:   12/11/24 167.6 cm (66\")     Body mass index is 29.41 kg/m².  Physical Exam  Constitutional:       General: She is not in acute distress.     Appearance: Normal appearance. She is not ill-appearing.   HENT:      Head: Normocephalic and atraumatic.      Mouth/Throat:      Mouth: Mucous membranes are moist.      Pharynx: No posterior oropharyngeal erythema.   Eyes:      General: No scleral icterus.  Cardiovascular:      Rate and Rhythm: Normal rate and regular rhythm.      Heart sounds: Normal heart sounds.   Pulmonary:      Effort: Pulmonary effort is normal.      Breath sounds: Normal breath sounds.   Abdominal:      General: Abdomen is flat. Bowel sounds are normal. There is " no distension.      Palpations: Abdomen is soft. There is no mass.      Tenderness: There is no abdominal tenderness in the epigastric area. There is no guarding or rebound. Negative signs include Vinson's sign.      Hernia: No hernia is present.   Musculoskeletal:      Cervical back: Neck supple.   Skin:     General: Skin is warm.      Capillary Refill: Capillary refill takes less than 2 seconds.   Neurological:      General: No focal deficit present.      Mental Status: She is alert and oriented to person, place, and time.   Psychiatric:         Mood and Affect: Mood normal.         Behavior: Behavior normal.         Thought Content: Thought content normal.         Judgment: Judgment normal.       CLINICAL DATA REVIEWED   reviewed previous lab results and integrated with today's visit, reviewed notes from other physicians and/or last GI encounter, reviewed previous endoscopy results and available photos, reviewed surgical pathology results from previous biopsies    ASSESSMENT  Diagnoses and all orders for this visit:    Elevated ALT measurement  -     US Liver; Future  -     FARERLL Fibrosure  -     Hepatitis B Surface Antigen  -     Immunoglobulins Quant; Future  -     Mitochondrial Antibodies, M2  -     Alpha - 1 - Antitrypsin Phenotype  -     ANANT  -     Gamma GT  -     Anti-Smooth Muscle Antibody Titer    Rectal bleeding  -     Case Request; Standing  -     Case Request    Other orders  -     Follow Anesthesia Guidelines / Protocol; Future  -     Verify bowel prep was successful; Standing  -     Give tap water enema if bowel prep was insufficient; Standing          PLAN    Rectal bleeding: Will schedule for colonoscopy, will reassess at follow-up, bleeding should resolve with daily fiber, plenty of fluids daily, anusol BID x5-7 days.  ALT: suspect MAFLD, will get liver labs and US, reviewed weight loss, trig improvement, maintain A1c <7  GERD: Reviewed pretreating triggers    Return in about 1 month (around  1/11/2025).    I have discussed the above plan with the patient.  They verbalize understanding and are in agreement with the plan.  They have been advised to contact the office for any questions, concerns, or changes related to their health.

## 2024-12-11 NOTE — PATIENT INSTRUCTIONS
10 g fiber powder daily  64 oz caffeine free fluids daily  Use anusol twice a day and after BM for 5-7 days  Call back if bleeding not resolved or any worsening

## 2024-12-12 LAB
ANA SER QL: NEGATIVE
MITOCHONDRIA M2 IGG SER-ACNC: <20 UNITS (ref 0–20)
SMA IGG SER-ACNC: 9 UNITS (ref 0–19)

## 2024-12-12 RX ORDER — HYDROCORTISONE 25 MG/G
CREAM TOPICAL 2 TIMES DAILY
Qty: 28 G | Refills: 0 | Status: SHIPPED | OUTPATIENT
Start: 2024-12-12

## 2024-12-14 LAB
A2 MACROGLOB SERPL-MCNC: 175 MG/DL (ref 110–276)
ALT SERPL W P-5'-P-CCNC: 52 IU/L (ref 0–40)
APO A-I SERPL-MCNC: 154 MG/DL (ref 116–209)
AST SERPL W P-5'-P-CCNC: 35 IU/L (ref 0–40)
BILIRUB SERPL-MCNC: 0.3 MG/DL (ref 0–1.2)
CHOLEST SERPL-MCNC: 187 MG/DL (ref 100–199)
FIBROSIS SCORING:: ABNORMAL
FIBROSIS STAGE SERPL QL: ABNORMAL
GGT SERPL-CCNC: 45 IU/L (ref 0–60)
GLUCOSE SERPL-MCNC: 213 MG/DL (ref 70–99)
HAPTOGLOB SERPL-MCNC: 141 MG/DL (ref 33–346)
LABORATORY COMMENT REPORT: ABNORMAL
LIVER FIBR SCORE SERPL CALC.FIBROSURE: 0.12 (ref 0–0.21)
LIVER STEATOSIS GRADE SERPL QL: ABNORMAL
LIVER STEATOSIS SCORE SERPL: 0.89 (ref 0–0.4)
NASH GRADE SERPL QL: ABNORMAL
NASH INTERPRETATION SERPL-IMP: ABNORMAL
NASH SCORE SERPL: 0.61 (ref 0–0.25)
NASH SCORING: ABNORMAL
STEATOSIS SCORING: ABNORMAL
TEST PERFORMANCE INFO SPEC: ABNORMAL
TEST PERFORMANCE INFO SPEC: ABNORMAL
TRIGL SERPL-MCNC: 237 MG/DL (ref 0–149)

## 2024-12-16 LAB
IGA SERPL-MCNC: 268 MG/DL (ref 87–352)
IGE SERPL-ACNC: 159 IU/ML (ref 6–495)
IGG SERPL-MCNC: 953 MG/DL (ref 586–1602)
IGM SERPL-MCNC: 119 MG/DL (ref 26–217)

## 2024-12-20 ENCOUNTER — HOSPITAL ENCOUNTER (OUTPATIENT)
Dept: ULTRASOUND IMAGING | Facility: HOSPITAL | Age: 54
Discharge: HOME | End: 2024-12-20
Payer: COMMERCIAL

## 2024-12-20 DIAGNOSIS — R74.01 ELEVATED ALT MEASUREMENT: ICD-10-CM

## 2024-12-20 PROCEDURE — 76705 ECHO EXAM OF ABDOMEN: CPT

## 2024-12-29 LAB
A1AT PHENOTYP SERPL IFE: NORMAL
A1AT SERPL-MCNC: 153 MG/DL (ref 101–187)

## 2024-12-31 ENCOUNTER — TELEPHONE (OUTPATIENT)
Dept: GASTROENTEROLOGY | Facility: CLINIC | Age: 54
End: 2024-12-31
Payer: COMMERCIAL

## 2024-12-31 NOTE — TELEPHONE ENCOUNTER
Caller: Ofe Whitehead    Relationship to patient: Self    Best call back number: [phone]    Chief complaint: CANCEL SCOPE    Type of visit: PROCEDURE    Additional notes:PT IS CALLING TO CANCEL SCOPE ON 01/07/25 WITH DR. GONZALEZ.  PT REQUESTING CALL BACK TO CONFIRM CANCELLATION.

## 2025-01-14 ENCOUNTER — OFFICE VISIT (OUTPATIENT)
Dept: GASTROENTEROLOGY | Facility: CLINIC | Age: 55
End: 2025-01-14
Payer: COMMERCIAL

## 2025-01-14 ENCOUNTER — LAB (OUTPATIENT)
Dept: LAB | Facility: HOSPITAL | Age: 55
End: 2025-01-14
Payer: COMMERCIAL

## 2025-01-14 VITALS
DIASTOLIC BLOOD PRESSURE: 88 MMHG | BODY MASS INDEX: 28.06 KG/M2 | HEIGHT: 66 IN | SYSTOLIC BLOOD PRESSURE: 132 MMHG | WEIGHT: 174.6 LBS

## 2025-01-14 DIAGNOSIS — K62.5 RECTAL BLEEDING: ICD-10-CM

## 2025-01-14 DIAGNOSIS — K75.81 METABOLIC DYSFUNCTION-ASSOCIATED STEATOHEPATITIS (MASH): Primary | ICD-10-CM

## 2025-01-14 LAB
ALBUMIN SERPL-MCNC: 4.1 G/DL (ref 3.5–5.2)
ALBUMIN/GLOB SERPL: 1.4 G/DL
ALP SERPL-CCNC: 87 U/L (ref 39–117)
ALT SERPL W P-5'-P-CCNC: 38 U/L (ref 1–33)
ANION GAP SERPL CALCULATED.3IONS-SCNC: 10 MMOL/L (ref 5–15)
AST SERPL-CCNC: 24 U/L (ref 1–32)
BILIRUB SERPL-MCNC: 0.4 MG/DL (ref 0–1.2)
BUN SERPL-MCNC: 11 MG/DL (ref 6–20)
BUN/CREAT SERPL: 15.7 (ref 7–25)
CALCIUM SPEC-SCNC: 9.2 MG/DL (ref 8.6–10.5)
CHLORIDE SERPL-SCNC: 101 MMOL/L (ref 98–107)
CO2 SERPL-SCNC: 27 MMOL/L (ref 22–29)
CREAT SERPL-MCNC: 0.7 MG/DL (ref 0.57–1)
EGFRCR SERPLBLD CKD-EPI 2021: 102.3 ML/MIN/1.73
GLOBULIN UR ELPH-MCNC: 3 GM/DL
GLUCOSE SERPL-MCNC: 146 MG/DL (ref 65–99)
POTASSIUM SERPL-SCNC: 4.8 MMOL/L (ref 3.5–5.2)
PROT SERPL-MCNC: 7.1 G/DL (ref 6–8.5)
SODIUM SERPL-SCNC: 138 MMOL/L (ref 136–145)

## 2025-01-14 PROCEDURE — 36415 COLL VENOUS BLD VENIPUNCTURE: CPT

## 2025-01-14 PROCEDURE — 80053 COMPREHEN METABOLIC PANEL: CPT

## 2025-01-14 NOTE — PROGRESS NOTES
PATIENT INFORMATION  Ofe Whitehead       - 1970    CHIEF COMPLAINT  Chief Complaint   Patient presents with    FARRELL       HISTORY OF PRESENT ILLNESS  Here today for evaluation of rectal bleeding     Bleeding resolved with anusol course and now daily fiber supplement, no longer sitting on toilet for a long time. Bowels daily and easy.     MASH: lost 11 lbs and down 8 now after covid and the stomach flu. She is motivated and reviewed risk of progression to cirrhosis and managing risk factors. Reviewed black coffee, mediterranean diet.    Mild HB maybe 2 days a week, resolves with milk, non smoker. Always related to certain foods and reviewed to pretreat triggers.     2019 last colon with 0 adenomas, 10 yr. No family hx CRC or polyps.        REVIEWED PERTINENT RESULTS/ LABS  Lab Results   Component Value Date    CASEREPORT  2024     Surgical Pathology Report                         Case: [id number]                                  Authorizing Provider:  Head, INDRA Ocampo          Collected:           2024 10:42 AM          Ordering Location:     [facility]   Received:            2024 04:58 PM                                 LABORATORY                                                                   Pathologist:           Varghese Yap MD                                                         Specimen:    Blood, Venous Line, Peripheral Blood Smears                                                FINALDX  2024     1.  Peripheral blood smear review:   -A.  Leukocytosis with absolute neutrophilia    Comment: There is a leukocytosis with absolute neutrophilia.  Leukocytosis of this degree and morphology can be seen with inflammation or infection.  Persistent unexplained leukocytosis generally requires further investigation       Lab Results   Component Value Date    HGB 13.6 2024    MCV 87.8 2024     2024    ALT 50 (H) 2024    AST  28 2024    HGBA1C 7.10 (H) 2024    TRIG 237 (H) 2024    IRON 120 2024    TIBC 393 2024      US Liver    Result Date: 2024  Narrative: US LIVER Date of Exam: 2024 7:31 AM EST Indication: elevated ALT. Comparison: No comparisons available. Technique: Grayscale and color Doppler ultrasound evaluation of the right upper quadrant was performed. FINDINGS: The gallbladder demonstrates no evidence for gallstones, gallbladder wall thickening, or pericholecystic edema. There is no intrahepatic or extrahepatic biliary dilatation. The common bile duct measures approximately 2 mm. The liver is diffusely echogenic indicating fatty infiltration. Focal fatty sparing is seen adjacent to the gallbladder fossa. No significant focal hepatic abnormalities are otherwise seen. The visualized portions of the pancreatic head and proximal body are unremarkable. Screening evaluation of the right kidney demonstrates no evidence for hydronephrosis. Flow is demonstrated within the portal and hepatic veins on Doppler evaluation.     Impression: 1.Evidence for diffuse hepatic fatty infiltration. Electronically Signed: Marcus Falcon MD  2024 8:03 AM EST  Workstation ID: [id number]     REVIEW OF SYSTEMS  Review of Systems      ACTIVE PROBLEMS  Patient Active Problem List    Diagnosis     Rectal bleeding [K62.5]     Vitamin D deficiency [E55.9]     Type 2 diabetes mellitus [E11.9]     Ear ache [H92.09]     Frontal sinusitis [J32.1]     Chest congestion [R09.89]     Cough [R05.9]          PAST MEDICAL HISTORY  Past Medical History:   Diagnosis Date    Allergic     Asthma     Colon polyp     Diabetes mellitus     History of medical problems     Hypoglycemia    Hypoglycemia     Irritable bowel syndrome          SURGICAL HISTORY  Past Surgical History:   Procedure Laterality Date     SECTION      COLONOSCOPY  2019    TUBAL ABDOMINAL LIGATION           FAMILY HISTORY  Family History  "  Problem Relation Age of Onset    Diabetes Mother     Hypertension Mother     Breast cancer Mother     Heart disease Father     COPD Father     Nephrolithiasis Father     Asthma Father     Colon cancer Neg Hx     Colon polyps Neg Hx          SOCIAL HISTORY  Social History     Occupational History    Not on file   Tobacco Use    Smoking status: Never    Smokeless tobacco: Never   Vaping Use    Vaping status: Never Used   Substance and Sexual Activity    Alcohol use: No    Drug use: No    Sexual activity: Yes     Partners: Male     Birth control/protection: None         CURRENT MEDICATIONS    Current Outpatient Medications:     albuterol sulfate  (90 Base) MCG/ACT inhaler, Inhale 2 puffs Every 4 (Four) Hours As Needed for Wheezing or Shortness of Air., Disp: 18 g, Rfl: 0    cetirizine-pseudoephedrine (ZyrTEC-D) 5-120 MG per 12 hr tablet, Take 1 tablet by mouth 2 (Two) Times a Day., Disp: , Rfl:     Ferrous Sulfate (IRON PO), Take  by mouth 2 (Two) Times a Week., Disp: , Rfl:     Hydrocortisone, Perianal, (ANUSOL-HC) 2.5 % rectal cream, Insert  into the rectum 2 (Two) Times a Day., Disp: 28 g, Rfl: 0    magnesium, as, gluconate (MAGONATE) 500 (27 Mg) MG tablet, Take 1 tablet by mouth 2 (Two) Times a Day. (Patient taking differently: Take 1 tablet by mouth 2 (Two) Times a Week.), Disp: , Rfl:     metFORMIN ER (GLUCOPHAGE-XR) 500 MG 24 hr tablet, TAKE 1 TABLET BY MOUTH DAILY  WITH BREAKFAST, Disp: 90 tablet, Rfl: 1    Probiotic Product (ALIGN PO), Take  by mouth., Disp: , Rfl:     VITAMIN D PO, Take  by mouth., Disp: , Rfl:     ALLERGIES  Patient has no known allergies.    VITALS  Vitals:    01/14/25 0807   BP: 132/88   BP Location: Left arm   Patient Position: Sitting   Cuff Size: Adult   Weight: 79.2 kg (174 lb 9.6 oz)   Height: 167.6 cm (65.98\")       PHYSICAL EXAM  Debilities/Disabilities Identified: None  Emotional Behavior: Appropriate  Wt Readings from Last 3 Encounters:   01/14/25 79.2 kg (174 lb 9.6 oz) " "  12/11/24 82.6 kg (182 lb 3.2 oz)   11/25/24 81.2 kg (179 lb)     Ht Readings from Last 1 Encounters:   01/14/25 167.6 cm (65.98\")     Body mass index is 28.19 kg/m².  Physical Exam  Constitutional:       General: She is not in acute distress.     Appearance: Normal appearance. She is not ill-appearing.   HENT:      Head: Normocephalic and atraumatic.      Mouth/Throat:      Mouth: Mucous membranes are moist.      Pharynx: No posterior oropharyngeal erythema.   Eyes:      General: No scleral icterus.  Cardiovascular:      Rate and Rhythm: Normal rate and regular rhythm.      Heart sounds: Normal heart sounds.   Pulmonary:      Effort: Pulmonary effort is normal.      Breath sounds: Normal breath sounds.   Abdominal:      General: Abdomen is flat. Bowel sounds are normal. There is no distension.      Palpations: Abdomen is soft. There is no mass.      Tenderness: There is no abdominal tenderness. There is no guarding or rebound. Negative signs include Vinson's sign.      Hernia: No hernia is present.   Musculoskeletal:      Cervical back: Neck supple.   Skin:     General: Skin is warm.      Capillary Refill: Capillary refill takes less than 2 seconds.   Neurological:      General: No focal deficit present.      Mental Status: She is alert and oriented to person, place, and time.   Psychiatric:         Mood and Affect: Mood normal.         Behavior: Behavior normal.         Thought Content: Thought content normal.         Judgment: Judgment normal.         CLINICAL DATA REVIEWED   reviewed previous lab results and integrated with today's visit, reviewed notes from other physicians and/or last GI encounter, reviewed previous endoscopy results and available photos, reviewed surgical pathology results from previous biopsies    ASSESSMENT  Diagnoses and all orders for this visit:    Metabolic dysfunction-associated steatohepatitis (MASH)  -     Comprehensive Metabolic Panel    Rectal bleeding          PLAN    Rectal " bleeding (resolved) Continue fiber  JELANI: Reviewed diet and weight loss, black coffee recommendations. Will check labs today and then decide on follow-up likely after PCP labs in May    Return in about 6 months (around 7/14/2025).    I have discussed the above plan with the patient.  They verbalize understanding and are in agreement with the plan.  They have been advised to contact the office for any questions, concerns, or changes related to their health.

## 2025-04-30 DIAGNOSIS — E11.65 TYPE 2 DIABETES MELLITUS WITH HYPERGLYCEMIA, WITHOUT LONG-TERM CURRENT USE OF INSULIN: ICD-10-CM

## 2025-04-30 RX ORDER — METFORMIN HYDROCHLORIDE 500 MG/1
500 TABLET, EXTENDED RELEASE ORAL
Qty: 90 TABLET | Refills: 3 | OUTPATIENT
Start: 2025-04-30

## 2025-05-07 DIAGNOSIS — Z51.81 MEDICATION MONITORING ENCOUNTER: ICD-10-CM

## 2025-05-07 DIAGNOSIS — E55.9 VITAMIN D DEFICIENCY: ICD-10-CM

## 2025-05-07 DIAGNOSIS — E11.65 TYPE 2 DIABETES MELLITUS WITH HYPERGLYCEMIA, WITHOUT LONG-TERM CURRENT USE OF INSULIN: Primary | ICD-10-CM

## 2025-05-20 DIAGNOSIS — E11.65 TYPE 2 DIABETES MELLITUS WITH HYPERGLYCEMIA, WITHOUT LONG-TERM CURRENT USE OF INSULIN: ICD-10-CM

## 2025-05-22 ENCOUNTER — OFFICE VISIT (OUTPATIENT)
Dept: FAMILY MEDICINE CLINIC | Facility: CLINIC | Age: 55
End: 2025-05-22
Payer: COMMERCIAL

## 2025-05-22 VITALS
OXYGEN SATURATION: 100 % | BODY MASS INDEX: 27.32 KG/M2 | SYSTOLIC BLOOD PRESSURE: 120 MMHG | HEART RATE: 73 BPM | TEMPERATURE: 97.7 F | HEIGHT: 66 IN | DIASTOLIC BLOOD PRESSURE: 82 MMHG | WEIGHT: 170 LBS

## 2025-05-22 DIAGNOSIS — D72.829 LEUKOCYTOSIS, UNSPECIFIED TYPE: ICD-10-CM

## 2025-05-22 DIAGNOSIS — E55.9 VITAMIN D DEFICIENCY: ICD-10-CM

## 2025-05-22 DIAGNOSIS — R20.0 NUMBNESS AND TINGLING OF BOTH FEET: Primary | ICD-10-CM

## 2025-05-22 DIAGNOSIS — R20.2 NUMBNESS AND TINGLING OF BOTH FEET: Primary | ICD-10-CM

## 2025-05-22 DIAGNOSIS — J45.20 MILD INTERMITTENT ASTHMA WITHOUT COMPLICATION: ICD-10-CM

## 2025-05-22 DIAGNOSIS — E11.65 TYPE 2 DIABETES MELLITUS WITH HYPERGLYCEMIA, WITHOUT LONG-TERM CURRENT USE OF INSULIN: ICD-10-CM

## 2025-05-22 PROCEDURE — 99214 OFFICE O/P EST MOD 30 MIN: CPT | Performed by: NURSE PRACTITIONER

## 2025-05-22 RX ORDER — METFORMIN HYDROCHLORIDE 500 MG/1
500 TABLET, EXTENDED RELEASE ORAL
Qty: 90 TABLET | Refills: 3 | OUTPATIENT
Start: 2025-05-22

## 2025-05-22 RX ORDER — ALBUTEROL SULFATE 90 UG/1
2 INHALANT RESPIRATORY (INHALATION) EVERY 4 HOURS PRN
Qty: 54 G | Refills: 1 | Status: SHIPPED | OUTPATIENT
Start: 2025-05-22

## 2025-05-22 RX ORDER — DULOXETIN HYDROCHLORIDE 30 MG/1
30 CAPSULE, DELAYED RELEASE ORAL DAILY
Qty: 30 CAPSULE | Refills: 0 | Status: SHIPPED | OUTPATIENT
Start: 2025-05-22

## 2025-05-22 RX ORDER — METFORMIN HYDROCHLORIDE 500 MG/1
500 TABLET, EXTENDED RELEASE ORAL
Qty: 90 TABLET | Refills: 1 | Status: SHIPPED | OUTPATIENT
Start: 2025-05-22

## 2025-05-22 NOTE — PROGRESS NOTES
Answers submitted by the patient for this visit:  Lower Extremity Injury Questionnaire (Submitted on 5/20/2025)  Chief Complaint: Extremity pain  Injury: No  Pain location: left foot, left toes, right foot, right toes  Pain quality: shooting, other  Pain - numeric: 7/10  Progression since onset: worse  tingling: Yes  numbness: Yes  Additional information: Neuropathy  Patient ID: Ofe Whitehead is a 55 y.o. female     Patient Care Team:  Head, INDRA Ocampo as PCP - General (Nurse Practitioner)  Nate Freeman MD as Consulting Physician (Obstetrics and Gynecology)    Subjective     Chief Complaint   Patient presents with    Diabetes    Peripheral Neuropathy     Feet        History of Present Illness      History of Present Illness  The patient presents for a 6-month checkup.    She continues to experience persistent tingling and numbness in her toes, despite various interventions. The symptoms are bilateral, with intermittent shooting pains and constant tingling. She has attempted to alleviate the discomfort with topical Biofreeze and a nerve roll-on, but these have proven ineffective. She has not initiated a regimen of vitamin B complex but continues to take vitamin D supplements. She recalls taking vitamin B complex in the past but discontinued it 1 to 2 years ago. She has been using magnesium, which initially provided some relief, but its efficacy has diminished over time.    She has a history of diabetes but does not regularly monitor her blood glucose levels at home. She has achieved a weight loss of 18 pounds, although she has recently regained 3 pounds. Her current medication regimen includes metformin, administered once daily.    She has been diagnosed with nonalcoholic steatohepatitis (FARRELL) and has been advised that weight loss could potentially improve her condition.    She reports no abdominal pain. She has a history of hemorrhoids, which have improved with treatment and weight loss. She has been using  magnesium supplements to manage constipation.    She has been using albuterol inhalers more frequently since the onset of spring and requests a refill.    SOCIAL HISTORY  She does not smoke.    FAMILY HISTORY  Her father had restless leg syndrome and neuropathy later in his life, around the age of 70.  Her mother has a history of elevated white blood cell count.    Results  Labs   - Hemoglobin A1c: 6.8 (down from 7.1)   - Vitamin B12: 537   - Vitamin D: Within normal range   - White blood cell count: Slightly elevated   - Glucose: 126   - Kidney function test: Normal   - Liver function test: Back to normal   - LDL cholesterol: Slightly elevated   - Thyroid function test: Normal       She denies any complaints of fever, chills, cough, chest pain, shortness of air, abdominal pain, nausea, or any other concerns.     The following portions of the patient's history were reviewed and updated as appropriate: allergies, current medications, past family history, past medical history, past social history, past surgical history and problem list.       ROS    Vitals:    05/22/25 0852   BP: 120/82   Pulse: 73   Temp: 97.7 °F (36.5 °C)   SpO2: 100%       Documented weights    05/22/25 0852   Weight: 77.1 kg (170 lb)     Body mass index is 27.46 kg/m².    Results for orders placed or performed in visit on 05/16/25   CBC (No Diff)    Collection Time: 05/16/25  9:38 AM    Specimen: Blood   Result Value Ref Range    WBC 11.52 (H) 3.40 - 10.80 10*3/mm3    RBC 5.05 3.77 - 5.28 10*6/mm3    Hemoglobin 14.5 12.0 - 15.9 g/dL    Hematocrit 43.7 34.0 - 46.6 %    MCV 86.5 79.0 - 97.0 fL    MCH 28.7 26.6 - 33.0 pg    MCHC 33.2 31.5 - 35.7 g/dL    RDW 12.5 12.3 - 15.4 %    Platelets 292 140 - 450 10*3/mm3   Comprehensive Metabolic Panel    Collection Time: 05/16/25  9:38 AM    Specimen: Blood   Result Value Ref Range    Glucose 126 (H) 65 - 99 mg/dL    BUN 13 6 - 20 mg/dL    Creatinine 0.78 0.57 - 1.00 mg/dL    EGFR Result 89.3 >60.0  mL/min/1.73    BUN/Creatinine Ratio 16.7 7.0 - 25.0    Sodium 141 136 - 145 mmol/L    Potassium 4.8 3.5 - 5.2 mmol/L    Chloride 102 98 - 107 mmol/L    Total CO2 26.4 22.0 - 29.0 mmol/L    Calcium 9.4 8.6 - 10.5 mg/dL    Total Protein 6.8 6.0 - 8.5 g/dL    Albumin 4.3 3.5 - 5.2 g/dL    Globulin 2.5 gm/dL    A/G Ratio 1.7 g/dL    Total Bilirubin 0.5 0.0 - 1.2 mg/dL    Alkaline Phosphatase 81 39 - 117 U/L    AST (SGOT) 23 1 - 32 U/L    ALT (SGPT) 30 1 - 33 U/L   Lipid Panel With / Chol / HDL Ratio    Collection Time: 05/16/25  9:38 AM    Specimen: Blood   Result Value Ref Range    Total Cholesterol 195 0 - 200 mg/dL    Triglycerides 116 0 - 150 mg/dL    HDL Cholesterol 49 40 - 60 mg/dL    VLDL Cholesterol Jimmy 21 5 - 40 mg/dL    LDL Chol Calc (NIH) 125 (H) 0 - 100 mg/dL    Chol/HDL Ratio 3.98    TSH Rfx On Abnormal To Free T4    Collection Time: 05/16/25  9:38 AM    Specimen: Blood   Result Value Ref Range    TSH 1.260 0.270 - 4.200 uIU/mL   Hemoglobin A1c    Collection Time: 05/16/25  9:38 AM    Specimen: Blood   Result Value Ref Range    Hemoglobin A1C 6.60 (H) 4.80 - 5.60 %   Vitamin D,25-Hydroxy    Collection Time: 05/16/25  9:38 AM    Specimen: Blood   Result Value Ref Range    25 Hydroxy, Vitamin D 39.0 30.0 - 100.0 ng/ml           Objective     Physical Exam  Vitals reviewed.   Constitutional:       General: She is not in acute distress.     Appearance: She is well-developed.   HENT:      Head: Normocephalic and atraumatic.      Right Ear: Tympanic membrane normal.      Left Ear: Tympanic membrane normal.   Eyes:      Pupils: Pupils are equal, round, and reactive to light.   Cardiovascular:      Rate and Rhythm: Normal rate and regular rhythm.      Heart sounds: Normal heart sounds. No murmur heard.  Pulmonary:      Effort: Pulmonary effort is normal.      Breath sounds: Normal breath sounds. No wheezing, rhonchi or rales.   Abdominal:      Palpations: Abdomen is soft.   Musculoskeletal:         General: No  tenderness. Normal range of motion.      Cervical back: Normal range of motion and neck supple.      Right lower leg: No edema.      Left lower leg: No edema.   Skin:     General: Skin is warm and dry.      Findings: No erythema or rash.   Neurological:      Mental Status: She is alert and oriented to person, place, and time.   Psychiatric:         Behavior: Behavior normal.         Physical Exam      Assessment & Plan     Assessment/Plan     Assessment & Plan  1. Neuropathy.  She continues to experience tingling and numbness in her toes, with occasional shooting pains, despite trying magnesium and other treatments.  Duloxetine will be initiated as it is effective for neuropathy.  Potential side effects, including unintentional weight gain and decreased libido, were discussed. It may take 3-4 weeks to notice any improvement.  If there is no change after 4 weeks, the dosage will be increased. If duloxetine does not help, further evaluation for restless leg syndrome may be considered.    2. Diabetes mellitus.  Her hemoglobin A1c has improved from 7.1 to 6.8, indicating better glycemic control.  She has lost weight, which is beneficial for diabetes management. She is currently on metformin once a day and does not regularly check her blood sugars at home.  She is advised to continue her current medication regimen and maintain her diet and exercise routine.  A prescription refill for metformin has been provided.    3. Elevated white blood cell count.  Her white blood cell count remains slightly elevated but fluctuates.  This could be due to allergies or a familial trait, as her mother also has elevated white counts.  She is advised to monitor for any symptoms such as fever, night sweats, or unintentional weight loss, which could indicate an underlying issue.  No immediate intervention is required, but continued monitoring is recommended.    4. Nonalcoholic steatohepatitis (FARRELL).  Her liver function tests have  normalized.  She has been advised that weight loss will help manage her fatty liver.  She is encouraged to continue her weight loss efforts.    No additional medications or interventions are necessary at this time.    5. Health maintenance.  She had an eye exam in 08/2024 and will see her GYN at [place] next Friday.  Her heart rate and blood pressure are good.  She is advised to continue taking vitamin D supplements daily as her levels are now within the normal range.  No further health maintenance issues were identified during this visit.    6. Medication management.  She requested refills for her albuterol inhaler, which she uses more frequently during the spring.  Refills for the albuterol inhaler have been provided.  She is advised to continue using magnesium and or Miralax for bowel regularity to prevent hemorrhoids.  No other medication changes were discussed during this visit.    Follow-up  The patient will follow up in 6 months or sooner if any complications arise.    Diagnoses and all orders for this visit:    1. Numbness and tingling of both feet (Primary)  -     DULoxetine (CYMBALTA) 30 MG capsule; Take 1 capsule by mouth Daily.  Dispense: 30 capsule; Refill: 0  -     Vitamin B12 & Folate; Future    2. Type 2 diabetes mellitus with hyperglycemia, without long-term current use of insulin  -     metFORMIN ER (GLUCOPHAGE-XR) 500 MG 24 hr tablet; Take 1 tablet by mouth Daily With Breakfast.  Dispense: 90 tablet; Refill: 1  -     Comprehensive Metabolic Panel; Future  -     Lipid Panel With / Chol / HDL Ratio; Future  -     TSH Rfx On Abnormal To Free T4; Future  -     Hemoglobin A1c; Future  -     CBC w MANUAL Differential; Future  -     UA / M With / Rflx Culture([facility] ONLY) - Urine, Clean Catch; Future  -     Microalbumin / Creatinine Urine Ratio - Urine, Clean Catch; Future    3. Mild intermittent asthma without complication  -     albuterol sulfate  (90 Base) MCG/ACT inhaler; Inhale 2 puffs Every  4 (Four) Hours As Needed for Wheezing or Shortness of Air.  Dispense: 54 g; Refill: 1    4. Vitamin D deficiency  -     Vitamin D,25-Hydroxy; Future    5. Leukocytosis, unspecified type  -     CBC w MANUAL Differential; Future        Follow Up:  Return in about 6 months (around 11/22/2025) for Annual physical with fasting labs.  .    In the meantime, instructed to contact us sooner for any problems or concerns.    Patient was given instructions and counseling regarding condition or for health maintenance advice.  Please see specific information pulled into the AVS if appropriate.      Patient or patient representative verbalized consent for the use of Ambient Listening during the visit with  INDRA Alba for chart documentation. 5/22/2025  12:47 EDT    INDRA Alba  Family Medicine  [facility]  05/22/25  12:47 EDT

## 2025-06-16 DIAGNOSIS — R20.0 NUMBNESS AND TINGLING OF BOTH FEET: ICD-10-CM

## 2025-06-16 DIAGNOSIS — R20.2 NUMBNESS AND TINGLING OF BOTH FEET: ICD-10-CM

## 2025-06-16 RX ORDER — DULOXETIN HYDROCHLORIDE 30 MG/1
30 CAPSULE, DELAYED RELEASE ORAL DAILY
Qty: 30 CAPSULE | Refills: 0 | Status: SHIPPED | OUTPATIENT
Start: 2025-06-16

## 2025-07-09 DIAGNOSIS — R20.2 NUMBNESS AND TINGLING OF BOTH FEET: ICD-10-CM

## 2025-07-09 DIAGNOSIS — R20.0 NUMBNESS AND TINGLING OF BOTH FEET: ICD-10-CM

## 2025-07-09 RX ORDER — DULOXETIN HYDROCHLORIDE 30 MG/1
30 CAPSULE, DELAYED RELEASE ORAL DAILY
Qty: 90 CAPSULE | Refills: 1 | Status: SHIPPED | OUTPATIENT
Start: 2025-07-09